# Patient Record
Sex: FEMALE | Race: BLACK OR AFRICAN AMERICAN | NOT HISPANIC OR LATINO | Employment: FULL TIME | URBAN - METROPOLITAN AREA
[De-identification: names, ages, dates, MRNs, and addresses within clinical notes are randomized per-mention and may not be internally consistent; named-entity substitution may affect disease eponyms.]

---

## 2017-02-06 ENCOUNTER — OFFICE VISIT (OUTPATIENT)
Dept: BARIATRICS | Facility: CLINIC | Age: 52
End: 2017-02-06
Payer: COMMERCIAL

## 2017-02-06 ENCOUNTER — LAB VISIT (OUTPATIENT)
Dept: LAB | Facility: HOSPITAL | Age: 52
End: 2017-02-06
Attending: SURGERY
Payer: COMMERCIAL

## 2017-02-06 VITALS
SYSTOLIC BLOOD PRESSURE: 170 MMHG | DIASTOLIC BLOOD PRESSURE: 106 MMHG | HEIGHT: 60 IN | BODY MASS INDEX: 39.73 KG/M2 | WEIGHT: 202.38 LBS | HEART RATE: 52 BPM

## 2017-02-06 DIAGNOSIS — I51.89 DIASTOLIC DYSFUNCTION: ICD-10-CM

## 2017-02-06 DIAGNOSIS — I10 ESSENTIAL HYPERTENSION: ICD-10-CM

## 2017-02-06 DIAGNOSIS — E66.9 OBESITY (BMI 30.0-34.9): ICD-10-CM

## 2017-02-06 DIAGNOSIS — Z98.84 S/P LAPAROSCOPIC SLEEVE GASTRECTOMY: ICD-10-CM

## 2017-02-06 DIAGNOSIS — G47.33 OBSTRUCTIVE SLEEP APNEA: ICD-10-CM

## 2017-02-06 LAB
25(OH)D3+25(OH)D2 SERPL-MCNC: 38 NG/ML
ALBUMIN SERPL BCP-MCNC: 3.9 G/DL
ALP SERPL-CCNC: 99 U/L
ALT SERPL W/O P-5'-P-CCNC: 25 U/L
ANION GAP SERPL CALC-SCNC: 7 MMOL/L
AST SERPL-CCNC: 20 U/L
BASOPHILS # BLD AUTO: 0.01 K/UL
BASOPHILS NFR BLD: 0.2 %
BILIRUB SERPL-MCNC: 0.5 MG/DL
BUN SERPL-MCNC: 21 MG/DL
CALCIUM SERPL-MCNC: 8.8 MG/DL
CHLORIDE SERPL-SCNC: 111 MMOL/L
CHOLEST/HDLC SERPL: 3.3 {RATIO}
CO2 SERPL-SCNC: 28 MMOL/L
CREAT SERPL-MCNC: 0.8 MG/DL
DIFFERENTIAL METHOD: ABNORMAL
EOSINOPHIL # BLD AUTO: 0.1 K/UL
EOSINOPHIL NFR BLD: 2.3 %
ERYTHROCYTE [DISTWIDTH] IN BLOOD BY AUTOMATED COUNT: 14 %
EST. GFR  (AFRICAN AMERICAN): >60 ML/MIN/1.73 M^2
EST. GFR  (NON AFRICAN AMERICAN): >60 ML/MIN/1.73 M^2
GLUCOSE SERPL-MCNC: 76 MG/DL
HCT VFR BLD AUTO: 41.9 %
HDL/CHOLESTEROL RATIO: 30.2 %
HDLC SERPL-MCNC: 222 MG/DL
HDLC SERPL-MCNC: 67 MG/DL
HGB BLD-MCNC: 13.2 G/DL
IRON SERPL-MCNC: 82 UG/DL
LDLC SERPL CALC-MCNC: 143.2 MG/DL
LYMPHOCYTES # BLD AUTO: 2.1 K/UL
LYMPHOCYTES NFR BLD: 47 %
MCH RBC QN AUTO: 28.5 PG
MCHC RBC AUTO-ENTMCNC: 31.5 %
MCV RBC AUTO: 91 FL
MONOCYTES # BLD AUTO: 0.2 K/UL
MONOCYTES NFR BLD: 5.3 %
NEUTROPHILS # BLD AUTO: 2 K/UL
NEUTROPHILS NFR BLD: 45 %
NONHDLC SERPL-MCNC: 155 MG/DL
PLATELET # BLD AUTO: 247 K/UL
PMV BLD AUTO: 10.5 FL
POTASSIUM SERPL-SCNC: 4 MMOL/L
PROT SERPL-MCNC: 7.3 G/DL
RBC # BLD AUTO: 4.63 M/UL
SATURATED IRON: 27 %
SODIUM SERPL-SCNC: 146 MMOL/L
TOTAL IRON BINDING CAPACITY: 303 UG/DL
TRANSFERRIN SERPL-MCNC: 205 MG/DL
TRIGL SERPL-MCNC: 59 MG/DL
VIT B12 SERPL-MCNC: 691 PG/ML
WBC # BLD AUTO: 4.38 K/UL

## 2017-02-06 PROCEDURE — 3077F SYST BP >= 140 MM HG: CPT | Mod: S$GLB,,, | Performed by: PHYSICIAN ASSISTANT

## 2017-02-06 PROCEDURE — 36415 COLL VENOUS BLD VENIPUNCTURE: CPT

## 2017-02-06 PROCEDURE — 82306 VITAMIN D 25 HYDROXY: CPT

## 2017-02-06 PROCEDURE — 85025 COMPLETE CBC W/AUTO DIFF WBC: CPT

## 2017-02-06 PROCEDURE — 80061 LIPID PANEL: CPT

## 2017-02-06 PROCEDURE — 80053 COMPREHEN METABOLIC PANEL: CPT

## 2017-02-06 PROCEDURE — 83540 ASSAY OF IRON: CPT

## 2017-02-06 PROCEDURE — 3080F DIAST BP >= 90 MM HG: CPT | Mod: S$GLB,,, | Performed by: PHYSICIAN ASSISTANT

## 2017-02-06 PROCEDURE — 99999 PR PBB SHADOW E&M-EST. PATIENT-LVL III: CPT | Mod: PBBFAC,,, | Performed by: PHYSICIAN ASSISTANT

## 2017-02-06 PROCEDURE — 82607 VITAMIN B-12: CPT

## 2017-02-06 PROCEDURE — 84466 ASSAY OF TRANSFERRIN: CPT

## 2017-02-06 PROCEDURE — 84425 ASSAY OF VITAMIN B-1: CPT

## 2017-02-06 PROCEDURE — 99214 OFFICE O/P EST MOD 30 MIN: CPT | Mod: S$GLB,,, | Performed by: PHYSICIAN ASSISTANT

## 2017-02-06 NOTE — PROGRESS NOTES
BARIATRIC FOLLOW UP:    HISTORY OF PRESENT ILLNESS: Lelia Mendoza is a 51 y.o. female, with a Body mass index is 39.53 kg/(m^2). presents for a 1 year follow up s/p Sleeve Gastrectomy with Dr. Leigh on 2/26/2015.  Her weight loss has always been slower since she is consistently low in daily protein.  She has done well overall and lost 54 lbs, approximately 42% of her excess weight. She has no other complaints.     Denies: nausea, vomiting, abdominal pain, changes in bowel movement pattern, fever, chills, dysphagia, chest pain, and shortness of breath.    Review of Systems   Constitutional: Negative for chills, fever and malaise/fatigue.   Eyes: Negative for blurred vision and double vision.   Respiratory: Negative for cough, hemoptysis and shortness of breath.    Cardiovascular: Negative for chest pain, palpitations and leg swelling.   Gastrointestinal: Negative for abdominal pain, blood in stool, constipation, diarrhea, heartburn, melena, nausea and vomiting.   Genitourinary: Negative for dysuria and hematuria.   Musculoskeletal: Negative.    Skin: Negative for rash.   Neurological: Negative for dizziness, tingling, weakness and headaches.   Endo/Heme/Allergies: Negative for environmental allergies. Does not bruise/bleed easily.   Psychiatric/Behavioral: Negative.        EXERCISE & VITAMINS:  See Bariatric Assessment    MEDICATIONS/ALLERGIES:  Have been reviewed.    DIET:  Regular Bariatric Diet.  Diet Recall.  Br:  Premier RTD (30 g), Nancy:  fish (15 g), Di:  Chicken wing (10 g), Sn: none (0 g), ~60 grams daily protein.    Vitals:    02/06/17 0857   BP: (!) 170/106   Pulse: (!) 52         Physical Exam   Constitutional: She is oriented to person, place, and time. She appears well-developed and well-nourished.   HENT:   Head: Normocephalic and atraumatic.   Cardiovascular: Normal rate and regular rhythm.    Pulmonary/Chest: Effort normal and breath sounds normal.   Abdominal: Soft. Bowel sounds are normal. She  exhibits no distension and no mass. There is no tenderness. There is no rebound and no guarding.   WHSS    Musculoskeletal: She exhibits no edema.   Neurological: She is alert and oriented to person, place, and time.   Skin: Skin is warm and dry. No rash noted. No erythema. No pallor.   Psychiatric: She has a normal mood and affect.   Nursing note and vitals reviewed.      ASSESSMENT:  - Obesity, Body mass index is 39.53 kg/(m^2). s/p sleeve gastrectomy on 2/26/2015.  - Co-morbidities: HTN (lower on medication), HLD (stable), GERD (stable)  - Good Weight loss, 54 lbs, 42% EWL  - No Exercise regimen  - Good Vitamin Regimen  - Fair Diet, low daily protein.    PLAN:  - Emphasized the importance of regular exercise and adherence to bariatric diet to achieve maximum weight loss.  She needs to get in more daily protein.  Gave her several tips on how to accomplish this.  Handouts given and all questions answered.  - No Bariatric Registered Dietician Available.  All Diet education and counseling done by PA.  - Encouraged patient to start regular exercise.  - Follow-up with dietician to reinforce diet.  - Continue daily vitamins and medications.  - RTC in 1 year or sooner if needed.  - Call the office for any issues.  - Check labs today and in 1 year.    25 minute visit, over 50% of time spent counseling patient face to face on diet, exercise, and weight loss.

## 2017-02-06 NOTE — MR AVS SNAPSHOT
[facility] - Bariatric Surgery  [address]  [facility] [address]  Phone: [phone]  Fax: [phone]                  Lelia Mendoza   2017 9:00 AM   Office Visit    Description:  Female : 1965   Provider:  Kristy Rachel PA-C   Department:  [facility] - Bariatric Surgery           Reason for Visit     Follow-up                To Do List           Goals (5 Years of Data)     None      [facility] On Call     [facility] On Call Nurse Care Line -  Assistance  Registered nurses in the [facility] On Call Center provide clinical advisement, health education, appointment booking, and other advisory services.  Call for this free service at [phone].             Medications           Message regarding Medications     Verify the changes and/or additions to your medication regime listed below are the same as discussed with your clinician today.  If any of these changes or additions are incorrect, please notify your healthcare provider.             Verify that the below list of medications is an accurate representation of the medications you are currently taking.  If none reported, the list may be blank. If incorrect, please contact your healthcare provider. Carry this list with you in case of emergency.           Current Medications     amlodipine (NORVASC) 10 MG tablet Take 1 tablet (10 mg total) by mouth once daily.    b complex vitamins tablet Take 1 tablet by mouth once daily.    CALCIUM CITRATE/VITAMIN D3 (CALCIUM CITRATE + ORAL) Take 30 mLs by mouth 2 (two) times daily.    cyanocobalamin, vitamin B-12, 5,000 mcg Subl Place 1 drop under the tongue as directed. 2x a week    multivitamin Liqd Take 30 mLs by mouth 2 (two) times daily.    sucralfate (CARAFATE) 1 gram tablet TAKE 1 TABLET (1 G TOTAL) BY MOUTH 4 (FOUR) TIMES DAILY.    gabapentin (NEURONTIN) 300 MG capsule Take 1 capsule (300 mg total) by mouth 3 (three) times daily.           Clinical Reference Information           Your  Vitals Were     BP Pulse Height Weight BMI    170/106 52 5' (1.524 m) 91.8 kg (202 lb 6.1 oz) 39.53 kg/m2      Blood Pressure          Most Recent Value    BP  (!)  170/106      Allergies as of 2/6/2017     Lisinopril      Immunizations Administered on Date of Encounter - 2/6/2017     None      Instructions    With meats take smaller bites and take longer to eat the meal.    - To lose weight you want to cut 100% starchy carbohydrates out of your diet (bread, rice, pasta, potatoes, granola, flour, corn, peas, oatmeal, grits, tortillas, crackers, chips) and get  grams of protein.  Aim for 100 grams of protein daily.    - Premier Protein (Chocolate, Bananas & Cream, Strawberries & Cream, Vanilla) Gisela or Costco    - Syntrax [place] from [employer], www.bariatricadVideoflowage.com, www.bariatricchoice.com. (LACTOSE FREE)    - Atkins Lift - Preventicet & Gisela (LACTOSE FREE)    - Veggetti Pro from [employer]ie[employer], Amazon, Bed Bath & Beyond    - www.Selleroutlet.com (cauliflower, cloud bread, quest bar cookies, eggplant, zucchini, zucchini noodles, crustless quiche, no carb meals, taco lettuce boats)    - http://theworldaccordingtoeggface.MedNews.ABOVE Solutions/    Cauliflower rice from Limos.com      Shantel's Bananas Foster Protein Shake    1 (11 oz.) Premier Protein Ready to Drink Bananas & Cream Shake  1/2 teaspoon Rum Extract  1 Tablespoon Sugar Free Butterscotch Pudding Mix, dry  dash of Cinnamon  a handful of Ice Cubes    Blend ingredients on high till well combined. I topped it (optional) with a squirt of Homemade, No Sugar Added Whipped Cream, a dash of Cinnamon, a Freeze-dried Banana Chip, and a swirl of Sugar Free Caramel Sauce.Yum! I don't advise lighting it on fire though.             Language Assistance Services     ATTENTION: Language assistance services are available, free of charge. Please call [phone].      ATENCIÓN: Si habla español, tiene a anderson disposición servicios gratuitos de asistencia lingüística. Llame al  [phone].     ALICIA Ý: N?u b?n nói Ti?ng Vi?t, có các d?ch v? h? tr? ngôn ng? mi?n phí dành cho b?n. G?i s? [phone].         [facility] complies with applicable Federal civil rights laws and does not discriminate on the basis of race, color, national origin, age, disability, or sex.

## 2017-02-06 NOTE — PATIENT INSTRUCTIONS
With meats take smaller bites and take longer to eat the meal.    - To lose weight you want to cut 100% starchy carbohydrates out of your diet (bread, rice, pasta, potatoes, granola, flour, corn, peas, oatmeal, grits, tortillas, crackers, chips) and get  grams of protein.  Aim for 100 grams of protein daily.    - Premier Protein (Chocolate, Bananas & Cream, Strawberries & Cream, Vanilla) Gisela or Costco    - Syntrax [place] from Vitamin Shoppe, www.bariatricadvantage.com, www.bariatricchoice.com. (LACTOSE FREE)    - Atkins Lift - [facility] & Gisela (LACTOSE FREE)    - Veggetti Pro from [employer]ation[employer], Amazon, Bed Bath & Beyond    - www.pinterest.com (cauliflower, cloud bread, quest bar cookies, eggplant, zucchini, zucchini noodles, crustless quiche, no carb meals, taco lettuce boats)    - http://thewmainaccordingtoeggface.BuzzStarter/    Cauliflower rice from Sprink[employer]rt      Shantel's Bananas Foster Protein Shake    1 (11 oz.) Premier Protein Ready to Drink Bananas & Cream Shake  1/2 teaspoon Rum Extract  1 Tablespoon Sugar Free Butterscotch Pudding Mix, dry  dash of Cinnamon  a handful of Ice Cubes    Blend ingredients on high till well combined. I topped it (optional) with a squirt of Homemade, No Sugar Added Whipped Cream, a dash of Cinnamon, a Freeze-dried Banana Chip, and a swirl of Sugar Free Caramel Sauce.Yum! I don't advise lighting it on fire though.

## 2017-02-07 LAB — VIT B1 SERPL-MCNC: 58 UG/L (ref 38–122)

## 2017-07-16 ENCOUNTER — HOSPITAL ENCOUNTER (EMERGENCY)
Facility: HOSPITAL | Age: 52
Discharge: HOME | End: 2017-07-16
Attending: EMERGENCY MEDICINE
Payer: COMMERCIAL

## 2017-07-16 VITALS
RESPIRATION RATE: 18 BRPM | HEIGHT: 61 IN | BODY MASS INDEX: 38.33 KG/M2 | WEIGHT: 203 LBS | TEMPERATURE: 99 F | OXYGEN SATURATION: 100 % | SYSTOLIC BLOOD PRESSURE: 140 MMHG | HEART RATE: 62 BPM | DIASTOLIC BLOOD PRESSURE: 82 MMHG

## 2017-07-16 DIAGNOSIS — S39.012A LOW BACK STRAIN, INITIAL ENCOUNTER: Primary | ICD-10-CM

## 2017-07-16 PROCEDURE — 96372 THER/PROPH/DIAG INJ SC/IM: CPT

## 2017-07-16 PROCEDURE — 99283 EMERGENCY DEPT VISIT LOW MDM: CPT | Mod: ,,, | Performed by: EMERGENCY MEDICINE

## 2017-07-16 PROCEDURE — 63600175 PHARM REV CODE 636 W HCPCS: Performed by: EMERGENCY MEDICINE

## 2017-07-16 PROCEDURE — 99283 EMERGENCY DEPT VISIT LOW MDM: CPT | Mod: 25

## 2017-07-16 RX ORDER — DEXAMETHASONE SODIUM PHOSPHATE 4 MG/ML
8 INJECTION, SOLUTION INTRA-ARTICULAR; INTRALESIONAL; INTRAMUSCULAR; INTRAVENOUS; SOFT TISSUE
Status: COMPLETED | OUTPATIENT
Start: 2017-07-16 | End: 2017-07-16

## 2017-07-16 RX ORDER — KETOROLAC TROMETHAMINE 30 MG/ML
10 INJECTION, SOLUTION INTRAMUSCULAR; INTRAVENOUS
Status: COMPLETED | OUTPATIENT
Start: 2017-07-16 | End: 2017-07-16

## 2017-07-16 RX ORDER — METHOCARBAMOL 750 MG/1
1500 TABLET, FILM COATED ORAL 3 TIMES DAILY PRN
Qty: 30 TABLET | Refills: 0 | Status: SHIPPED | OUTPATIENT
Start: 2017-07-16 | End: 2017-07-21

## 2017-07-16 RX ORDER — NAPROXEN 375 MG/1
375 TABLET ORAL 2 TIMES DAILY PRN
Qty: 30 TABLET | Refills: 0 | Status: SHIPPED | OUTPATIENT
Start: 2017-07-16 | End: 2017-08-08

## 2017-07-16 RX ADMIN — DEXAMETHASONE SODIUM PHOSPHATE 8 MG: 4 INJECTION, SOLUTION INTRAMUSCULAR; INTRAVENOUS at 04:07

## 2017-07-16 RX ADMIN — KETOROLAC TROMETHAMINE 10 MG: 30 INJECTION, SOLUTION INTRAMUSCULAR at 04:07

## 2017-07-16 NOTE — ED PROVIDER NOTES
Encounter Date: 2017    SCRIBE #1 NOTE: I, Katarzyna Martinez, am scribing for, and in the presence of,  Dr. Sethi. I have scribed the entire note.       History     Chief Complaint   Patient presents with    Flank Pain     Pt reports left sided flank pain that began around 2300. Denies painful urination or difficulty urinating.     Time seen by provider: 3:51 AM    This is a 52 y.o. female with PM Hx of HTN who presents with complaint of acute onset of left-sided flank pain that woke her up from sleep. No recent exertion or heavy lifting. Pt denies dysuria, abdominal pain, or any other symptoms at this time.       The history is provided by the patient and medical records.     Review of patient's allergies indicates:   Allergen Reactions    Lisinopril Swelling     Swelling of lips     Past Medical History:   Diagnosis Date    Hyperlipidemia LDL goal < 130     Hypertension     Migraine     over 10 years but not so bad lately    PHONG on CPAP     2015     Past Surgical History:   Procedure Laterality Date     SECTION      GASTRECTOMY      HYSTERECTOMY      with oopherectomy (1)     Family History   Problem Relation Age of Onset    Hypertension Mother     Arthritis Mother     Asthma Mother     Hyperlipidemia Mother     Heart disease Father     Stroke Brother     Heart disease Brother     Diabetes Maternal Grandmother     Diabetes Maternal Grandfather     Diabetes Paternal Grandmother     Diabetes Paternal Grandfather      Social History   Substance Use Topics    Smoking status: Never Smoker    Smokeless tobacco: Never Used    Alcohol use Yes      Comment: social     Review of Systems   Constitutional: Negative for fever.   Gastrointestinal: Negative for abdominal pain.   Genitourinary: Positive for flank pain (left). Negative for difficulty urinating and dysuria.       Physical Exam     Initial Vitals [17 0257]   BP Pulse Resp Temp SpO2   (!) 149/83 70 19 98.9 °F (37.2 °C) 100 %       MAP       105         Physical Exam    Nursing note and vitals reviewed.  Constitutional: She appears well-developed and well-nourished. She is not diaphoretic. No distress.   HENT:   Head: Normocephalic and atraumatic.   Mouth/Throat: Oropharynx is clear and moist.   Eyes: Conjunctivae are normal.   Neck: Normal range of motion. Neck supple. No JVD present.   Cardiovascular: Normal rate, regular rhythm and normal heart sounds.   No murmur heard.  Pulmonary/Chest: Breath sounds normal. No respiratory distress. She has no wheezes. She has no rhonchi. She has no rales.   Abdominal: Soft. Bowel sounds are normal. She exhibits no mass. There is no tenderness. There is no rebound and no guarding.   Musculoskeletal: Normal range of motion. She exhibits no edema.   Low back tenderness over the left erector spinae muscles. No bony tenderness. Movement of muscle worsens pain. Negative SLR bilaterally.    Neurological: She is alert and oriented to person, place, and time. She has normal strength. No cranial nerve deficit or sensory deficit.   Skin: Skin is warm and dry. No rash noted.   Psychiatric: She has a normal mood and affect.         ED Course   Procedures  Labs Reviewed - No data to display          Medical Decision Making:   History:   Old Medical Records: I decided to obtain old medical records.  Initial Assessment:   Pt presents with low back pain that woke her form sleep. Hurts with movement. Exam is confirmatory for musculoskeletal origin. I doubt renal colic, AAA, and infection. No urinary symptoms at this time. I don't think testing is indicated. Will treat with NSAID's and muscle relaxer.            Scribe Attestation:   Scribe #1: I performed the above scribed service and the documentation accurately describes the services I performed. I attest to the accuracy of the note.    Attending Attestation:           Physician Attestation for Scribe:  Physician Attestation Statement for Scribe #1: I, Dr. Sethi,  reviewed documentation, as scribed by Katarzyna Martinez in my presence, and it is both accurate and complete.                 ED Course     Clinical Impression:   The encounter diagnosis was Low back strain, initial encounter.    Disposition:   Disposition: Discharged  Condition: Stable                        Trey Sethi MD  07/18/17 9688

## 2017-07-16 NOTE — ED TRIAGE NOTES
Left flank pain radiates to left abdominal wall that started at 2300 last night. Denies pain similar in the past, n/v/d/cp/sob.

## 2017-08-08 ENCOUNTER — OFFICE VISIT (OUTPATIENT)
Dept: FAMILY MEDICINE | Facility: HOSPITAL | Age: 52
End: 2017-08-08
Attending: FAMILY MEDICINE
Payer: COMMERCIAL

## 2017-08-08 VITALS
DIASTOLIC BLOOD PRESSURE: 86 MMHG | HEART RATE: 59 BPM | SYSTOLIC BLOOD PRESSURE: 138 MMHG | BODY MASS INDEX: 39.41 KG/M2 | WEIGHT: 208.56 LBS

## 2017-08-08 DIAGNOSIS — M54.50 ACUTE LEFT-SIDED LOW BACK PAIN WITHOUT SCIATICA: Primary | ICD-10-CM

## 2017-08-08 PROCEDURE — 99213 OFFICE O/P EST LOW 20 MIN: CPT | Performed by: FAMILY MEDICINE

## 2017-08-08 RX ORDER — NAPROXEN 500 MG/1
500 TABLET ORAL 2 TIMES DAILY WITH MEALS
Qty: 28 TABLET | Refills: 0 | Status: SHIPPED | OUTPATIENT
Start: 2017-08-08 | End: 2017-08-22

## 2017-08-08 RX ORDER — HYDROGEN PEROXIDE 3 %
20 SOLUTION, NON-ORAL MISCELLANEOUS
Qty: 30 CAPSULE | Refills: 11 | Status: SHIPPED | OUTPATIENT
Start: 2017-08-08 | End: 2017-09-20

## 2017-08-08 NOTE — PROGRESS NOTES
I have reviewed the notes, assessments, and/or procedures performed by Dr. Ayers, I concur with her/his documentation of Lelia Mendoza.

## 2017-08-08 NOTE — PROGRESS NOTES
Subjective:       Patient ID: Lelia Mendoza is a 52 y.o. female.    Chief Complaint: Back Pain    Patient is a 51 yo F that presents to the clinic for complaints of left sided lower back pain that has been ongoing for 3 weeks. States that there is some radiation down left lower ext but no change in strength.        Review of Systems   Constitutional: Negative for chills and fever.   HENT: Negative for congestion and sore throat.    Eyes: Negative for visual disturbance.   Respiratory: Negative for cough, choking, chest tightness and shortness of breath.    Cardiovascular: Negative for chest pain.   Gastrointestinal: Negative for abdominal pain, constipation, diarrhea, nausea and vomiting.   Endocrine: Negative for polyuria.   Genitourinary: Negative for dysuria.   Musculoskeletal: Positive for back pain.   Neurological: Negative for dizziness, weakness and headaches.       Objective:      Vitals:    08/08/17 1602   BP: 138/86   Pulse: (!) 59     Physical Exam   Constitutional: She is oriented to person, place, and time. She appears well-developed and well-nourished.   HENT:   Head: Normocephalic and atraumatic.   Neck: Normal range of motion. Neck supple.   Cardiovascular: Normal rate, regular rhythm, normal heart sounds and intact distal pulses.  Exam reveals no gallop and no friction rub.    No murmur heard.  Pulmonary/Chest: Effort normal and breath sounds normal. No respiratory distress. She has no wheezes. She has no rales. She exhibits no tenderness.   Abdominal: Soft. Bowel sounds are normal. There is no tenderness.   Musculoskeletal: She exhibits tenderness. She exhibits no edema or deformity.        Right shoulder: She exhibits tenderness. She exhibits normal range of motion, no bony tenderness, no swelling, no effusion and no crepitus.   Neurological: She is alert and oriented to person, place, and time.   Skin: Skin is warm and dry.   Nursing note and vitals reviewed.      Assessment:       1. Acute  left-sided low back pain without sciatica        Plan:       Acute left-sided low back pain without sciatica  -     Ambulatory consult to Physical Therapy    Other orders  -     naproxen (NAPROSYN) 500 MG tablet; Take 1 tablet (500 mg total) by mouth 2 (two) times daily with meals.  Dispense: 28 tablet; Refill: 0  -     esomeprazole (NEXIUM) 20 MG capsule; Take 1 capsule (20 mg total) by mouth before breakfast.  Dispense: 30 capsule; Refill: 11      Dario Ayers MD  8/8/2017  [employer]  PGY-3

## 2017-08-09 ENCOUNTER — PATIENT MESSAGE (OUTPATIENT)
Dept: CARDIOLOGY | Facility: CLINIC | Age: 52
End: 2017-08-09

## 2017-08-09 ENCOUNTER — PATIENT MESSAGE (OUTPATIENT)
Dept: FAMILY MEDICINE | Facility: CLINIC | Age: 52
End: 2017-08-09

## 2017-08-09 NOTE — TELEPHONE ENCOUNTER
Good evening Ms. Mendoza, I apologize about your situation. Try Zantac over the counter and please let me know if it helps.   Dario Ayers MD  8/9/2017

## 2017-08-17 ENCOUNTER — CLINICAL SUPPORT (OUTPATIENT)
Dept: REHABILITATION | Facility: HOSPITAL | Age: 52
End: 2017-08-17
Attending: FAMILY MEDICINE
Payer: COMMERCIAL

## 2017-08-17 DIAGNOSIS — R26.9 GAIT ABNORMALITY: ICD-10-CM

## 2017-08-17 DIAGNOSIS — M53.86 DECREASED ROM OF LUMBAR SPINE: ICD-10-CM

## 2017-08-17 DIAGNOSIS — R20.0 NUMBNESS AND TINGLING OF LEFT LEG: ICD-10-CM

## 2017-08-17 DIAGNOSIS — M25.669 DECREASED ROM OF LOWER EXTREMITY: ICD-10-CM

## 2017-08-17 DIAGNOSIS — G89.29 CHRONIC LEFT-SIDED LOW BACK PAIN WITH LEFT-SIDED SCIATICA: ICD-10-CM

## 2017-08-17 DIAGNOSIS — R29.3 POOR POSTURE: ICD-10-CM

## 2017-08-17 DIAGNOSIS — M54.42 CHRONIC LEFT-SIDED LOW BACK PAIN WITH LEFT-SIDED SCIATICA: ICD-10-CM

## 2017-08-17 DIAGNOSIS — R20.2 NUMBNESS AND TINGLING OF LEFT LEG: ICD-10-CM

## 2017-08-17 DIAGNOSIS — Z74.09 IMPAIRED FUNCTIONAL MOBILITY AND ACTIVITY TOLERANCE: ICD-10-CM

## 2017-08-17 DIAGNOSIS — R53.1 DECREASED STRENGTH: ICD-10-CM

## 2017-08-17 PROCEDURE — 97162 PT EVAL MOD COMPLEX 30 MIN: CPT | Mod: PO

## 2017-08-17 NOTE — PROGRESS NOTES
Physical Therapy Evaluation    Name: Lelia Mendoza  Clinic Number: [id number]      Diagnosis:   Encounter Diagnoses   Name Primary?    Chronic left-sided low back pain with left-sided sciatica     Decreased strength     Poor posture     Numbness and tingling of left leg     Impaired functional mobility and activity tolerance     Gait abnormality     Decreased ROM of lower extremity     Decreased ROM of lumbar spine      Physician: Dario Ayers MD  Treatment Orders: PT Eval and Treat    Past Medical History:   Diagnosis Date    Hyperlipidemia LDL goal < 130     Hypertension     Migraine     over 10 years but not so bad lately    PHONG on CPAP     6/2015     Current Outpatient Prescriptions   Medication Sig    amlodipine (NORVASC) 10 MG tablet Take 1 tablet (10 mg total) by mouth once daily.    b complex vitamins tablet Take 1 tablet by mouth once daily.    CALCIUM CITRATE/VITAMIN D3 (CALCIUM CITRATE + ORAL) Take 30 mLs by mouth 2 (two) times daily.    cyanocobalamin, vitamin B-12, 5,000 mcg Subl Place 1 drop under the tongue as directed. 2x a week    esomeprazole (NEXIUM) 20 MG capsule Take 1 capsule (20 mg total) by mouth before breakfast.    multivitamin Liqd Take 30 mLs by mouth 2 (two) times daily.    naproxen (NAPROSYN) 500 MG tablet Take 1 tablet (500 mg total) by mouth 2 (two) times daily with meals.     No current facility-administered medications for this visit.      Review of patient's allergies indicates:   Allergen Reactions    Lisinopril Swelling     Swelling of lips         Evaluation Date: 8/17/17  Visit # authorized: 1  POC period: 8/17/17-10/12/17      Subjective/History   Onset/PATO: sudden- pt just woke up in the middle of the night with her symptoms    Precautions: standard  Medical Diagnosis: low back pain  PT Diagnosis: low back pain with scaitica, impaired gait, impaired posture, decreased strength, decreased ROM, impaired functional mobility  Chief complaint: Burning in L lower  back with intermittent numbness/tingling in lateral aspect of L thigh/LE  History of Present Illness: Lelia is a 52 y.o. female that presents to [facility] secondary to L sided back and LE pain. Symptoms started ~ 1 month ago. The pain woke her up in the middle of the night.  Pt had L flank burning and then numbness in L thigh. Pt went to the ER- got 2 shots (one was a steroid, pt unsure of the other). Pt also received pain medication. Pt states she had been able to use raise her LLE on medication, but not anymore. Now pt with patch of burning on L side of low back and intermittent numbness at L lateral thigh to knee    X-ray and MRI was taken and revealed: none  Prior Therapy: none  Nutrition:  Obese      Social History: Pt lives with her mom in a house  Place of Residence (Steps/Adaptations/Levels): 1 step to enter home, pt has to hold on. Home is one level inside. Has walk-in shower and a tub  Previous functional status includes: pt reports a history of LLE weakness and pain- pt states she's had to lift her LLE to get in and out of the car; uses a step stool to get into bed  Current functional status:  Pt has difficulty stooping down, difficulty going up and down steps- has to use ramp at the parking garage. Pt can't sit for too long- pt can sit for ~30min before pain limits her mobility. Pt can stand for ~ 1 hour before pain limits her.  Pt unable to sleep on her L side.  Pt has a decrease ability to perform ADLs such as L leg dressing, bathing, applying lotion (while seated- it's better in standing)  Exercise routine prior to onset : pt was trying to run, but she states it hurts. Pt has an elliptical at home, but she hasn't used it in a while  DME owned: grab bars in the bathroom (for pt's mom).  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 Pt works in [employer] at [employer].                        Job description includes:  Pt sits all day. Pt reports that since this started,  "she's been getting up and walking every 30min to manage symptoms    Pain: in L low back  Onset of pain /Mechanism of Onset:  Sudden- pt just woke up with the pain  Chief complaint:  Burning in back and weakness in LLE   Radicular symptoms:  Intermittent numbness at lateral aspect of thigh  Aggravating factors: sitting, standing, stooping, bending over in sitting, sleeping on pt's L side  Easing factors:  Pain medication, gabapentin, heating pad  Pain at current: 8/10  Pain at best: 2/10  Pain at worst:10       Patient Goals: Pt would like to decrease pain and increase function in order to return to normal daily activities and PLOF. "I want to be able to stoop down and get up with no problem."  No cultural, environmental, or spiritual barriers identified to treatment or learning.       Objective     Observation: pt received amb without a device    Posture:  Rounded shoulders, increased lumbar lordosis, B genu valgus L > R    Lumbar Range of Motion:    Degrees Pain   Flexion WFL   no        Extension 2/3 range   Yes- down L gluteal region        Left Side Bending 2/3 range Yes- down L gluteal region        Right Side Bending WFL no        Left rotation   WFL Yes- down L gluteal region        Right Rotation   WFL No             Lower Extremity Strength  Right LE  Left LE    Knee extension: 5/5 Knee extension: 4+/5   Knee flexion: 5/5 Knee flexion: 5/5   Hip flexion: 5/5 Hip flexion: 3-/5   Hip extension:  TBA Hip extension: TBA   Hip abduction: 5/5 Hip abduction: 5/5   Hip adduction: 5/5 Hip adduction 5/5   Ankle dorsiflexion: 5/5 Ankle dorsiflexion: 5/5   Ankle plantarflexion: 5/5 Ankle plantarflexion: 5/5       Special Tests:    -SLR Test: (+) on the L c/ Braggard's sign  -Ortiz's Test: (-) Bilaterally  - Slump test: (+) on the L    Palpation: no tenderness to palpation    Sensation: intact B and symmetrically      Functional Limitations Reports - G Codes  Category: Mobility   Tool: FOTO- lumbar  Score: 52% (48% " limitation)  Current: CK at least 40% < 60% impaired, limited or restricted  Goal: CJ at least 20% < 40% impaired, limited or restricted      PT Evaluation Completed? Yes  Discussed Plan of Care with patient: Yes    Pt received moist heat to L low back x 10 min at end of session. Pt reports some pain relief with intervention     Instructed pt. Regarding: Using pillow support between her knees when lying on her side for improved support and hip/spinal alignment. Pt provided with HEP consisting of standing HS stretch on step and seated sciatic nerve glides. Proper technique with all exercises. Pt was advised to perform these exercises free of pain, and to stop performing them if pain occurs. Pt demo good understanding of the education provided. Lelia demonstrated good return demonstration of activities.      Assessment     History  Co-morbidities and personal factors that may impact the plan of care Examination  Body Structures and Functions, activity limitations and participation restrictions that may impact the plan of care. Medical necessity is demonstrated by the following impairments. Clinical Presentation Decision Making/ Complexity Score   1. History of LLE weakness 1. Decreased strength  2. Pain  3. Impaired functional mobility  4. Decreased ROM  5. Impaired gait Evolving      moderate high moderate moderate           This is a 52 y.o. female referred to outpatient physical therapy and presents with a medical diagnosis of low back pain and demonstrates limitations as described in the problem list. At this time, pt appears to have sciatica either related to nerve root entrapment or disc herniation. Will continue to evaluate and assess. Pt will benefit from physcial therapy services in order to maximize pain free functional independence. The following goals were discussed with the patient and patient is in agreement with them as to be addressed in the treatment plan. Pt verbally understood the instructions as  they were given and demonstrated proper form and technique during therapy.     Medical necessity is demonstrated by the following IMPAIRMENTS/PROMBLEM LIST:    1)Increase in pain level limiting function   2)Core/back and LE weakness   3)Decreased spinal mobility   4)Decreased ability to participate in daily activities   5) Impaired muscle tone   6)Lack of HEP, requires skilled supervision to complete and progress HEP    7) Decreased community ambulation and stair negotiation   8) Decreased ability to participate in vocational pursuits    9)Poor posture   10) decreased activity tolerance/endurance      GOALS: Short Term Goals:  4 weeks  1.Report decreased back  pain  <   / =  5  /10  to increase tolerance for ADLs, sitting, walking, bending/stooping and other functional and work activities  2. Pt to increase back ROM to WFL t/o for improved functional mobility.  3. Increased MMT  By 1/2 grade  to increase tolerance for ADL, functional and work activities, as well as improved posture.  4. Pt to report ability to sit for 1 hour without back pain for improved ability to complete work tasks.  5. Pt to tolerate HEP to improve ROM and independence with ADL's  6. Pt will demo ability to comfortably reach her LE's for improved ability to perform lower body ADL's.  7. Pt will negotiate 1 flight of stairs with Mod I for improved community access.    Long Term Goals: 8 weeks  1.Report decreased    back  pain  <   / =  3  /10  to increase tolerance for  ADLs, sitting, walking, bending/stooping and other functional and work activities  2.Pt to demo ability to stoop/squat to  item off of the floor with proper body mechanics and no pain for improved functional mobility  3.Increased MMT by 1 grade  to increase tolerance for ADL and work activities,as well as for improved posture  4.Pt will report at CJ level (20-40% impaired) on FOTO to demonstrate decrease in disability and improvement in back pain.  5. Pt to be Independent  with HEP to improve ROM and independence with ADL's  6. Pt to report being able to stand/walk for >/=80min for improved functional mobility and community access      Plan     Pt will be treated by physical therapy 2 times a weekly for Pt Education, HEP, therapeutic exercises, neuromuscular re-education, therapeutic activities, manual therapy, joint mobilizations, and modalities PRN to achieve established goals. Lelia may at times be seen by a PTA as part of the Rehab Team.     Cont PT for 8 weeks.      Gloria Lynn, PT  08/18/2017      I certify the need for these services furnished under this plan of treatment and while under my care.______________________________ Physician/Referring Practitioner  Date of Signature

## 2017-08-18 NOTE — PLAN OF CARE
Physical Therapy Evaluation    Name: Lelia Mendoza  Clinic Number: [id number]      Diagnosis:   Encounter Diagnoses   Name Primary?    Chronic left-sided low back pain with left-sided sciatica     Decreased strength     Poor posture     Numbness and tingling of left leg     Impaired functional mobility and activity tolerance     Gait abnormality     Decreased ROM of lower extremity     Decreased ROM of lumbar spine      Physician: Dario Ayers MD  Treatment Orders: PT Eval and Treat    Past Medical History:   Diagnosis Date    Hyperlipidemia LDL goal < 130     Hypertension     Migraine     over 10 years but not so bad lately    PHONG on CPAP     6/2015     Current Outpatient Prescriptions   Medication Sig    amlodipine (NORVASC) 10 MG tablet Take 1 tablet (10 mg total) by mouth once daily.    b complex vitamins tablet Take 1 tablet by mouth once daily.    CALCIUM CITRATE/VITAMIN D3 (CALCIUM CITRATE + ORAL) Take 30 mLs by mouth 2 (two) times daily.    cyanocobalamin, vitamin B-12, 5,000 mcg Subl Place 1 drop under the tongue as directed. 2x a week    esomeprazole (NEXIUM) 20 MG capsule Take 1 capsule (20 mg total) by mouth before breakfast.    multivitamin Liqd Take 30 mLs by mouth 2 (two) times daily.    naproxen (NAPROSYN) 500 MG tablet Take 1 tablet (500 mg total) by mouth 2 (two) times daily with meals.     No current facility-administered medications for this visit.      Review of patient's allergies indicates:   Allergen Reactions    Lisinopril Swelling     Swelling of lips         Evaluation Date: 8/17/17  Visit # authorized: 1  POC period: 8/17/17-10/12/17      Subjective/History   Onset/PATO: sudden- pt just woke up in the middle of the night with her symptoms    Precautions: standard  Medical Diagnosis: low back pain  PT Diagnosis: low back pain with scaitica, impaired gait, impaired posture, decreased strength, decreased ROM, impaired functional mobility  Chief complaint: Burning in L lower  back with intermittent numbness/tingling in lateral aspect of L thigh/LE  History of Present Illness: Lelia is a 52 y.o. female that presents to [facility] secondary to L sided back and LE pain. Symptoms started ~ 1 month ago. The pain woke her up in the middle of the night.  Pt had L flank burning and then numbness in L thigh. Pt went to the ER- got 2 shots (one was a steroid, pt unsure of the other). Pt also received pain medication. Pt states she had been able to use raise her LLE on medication, but not anymore. Now pt with patch of burning on L side of low back and intermittent numbness at L lateral thigh to knee    X-ray and MRI was taken and revealed: none  Prior Therapy: none  Nutrition:  Obese      Social History: Pt lives with her mom in a house  Place of Residence (Steps/Adaptations/Levels): 1 step to enter home, pt has to hold on. Home is one level inside. Has walk-in shower and a tub  Previous functional status includes: pt reports a history of LLE weakness and pain- pt states she's had to lift her LLE to get in and out of the car; uses a step stool to get into bed  Current functional status:  Pt has difficulty stooping down, difficulty going up and down steps- has to use ramp at the parking garage. Pt can't sit for too long- pt can sit for ~30min before pain limits her mobility. Pt can stand for ~ 1 hour before pain limits her.  Pt unable to sleep on her L side.  Pt has a decrease ability to perform ADLs such as L leg dressing, bathing, applying lotion (while seated- it's better in standing)  Exercise routine prior to onset : pt was trying to run, but she states it hurts. Pt has an elliptical at home, but she hasn't used it in a while  DME owned: grab bars in the bathroom (for pt's mom).  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 Pt works in [employer] at [employer].                        Job description includes:  Pt sits all day. Pt reports that since this started,  "she's been getting up and walking every 30min to manage symptoms    Pain: in L low back  Onset of pain /Mechanism of Onset:  Sudden- pt just woke up with the pain  Chief complaint:  Burning in back and weakness in LLE   Radicular symptoms:  Intermittent numbness at lateral aspect of thigh  Aggravating factors: sitting, standing, stooping, bending over in sitting, sleeping on pt's L side  Easing factors:  Pain medication, gabapentin, heating pad  Pain at current: 8/10  Pain at best: 2/10  Pain at worst:10       Patient Goals: Pt would like to decrease pain and increase function in order to return to normal daily activities and PLOF. "I want to be able to stoop down and get up with no problem."  No cultural, environmental, or spiritual barriers identified to treatment or learning.       Objective     Observation: pt received amb without a device    Posture:  Rounded shoulders, increased lumbar lordosis, B genu valgus L > R    Lumbar Range of Motion:    Degrees Pain   Flexion WFL   no        Extension 2/3 range   Yes- down L gluteal region        Left Side Bending 2/3 range Yes- down L gluteal region        Right Side Bending WFL no        Left rotation   WFL Yes- down L gluteal region        Right Rotation   WFL No             Lower Extremity Strength  Right LE  Left LE    Knee extension: 5/5 Knee extension: 4+/5   Knee flexion: 5/5 Knee flexion: 5/5   Hip flexion: 5/5 Hip flexion: 3-/5   Hip extension:  TBA Hip extension: TBA   Hip abduction: 5/5 Hip abduction: 5/5   Hip adduction: 5/5 Hip adduction 5/5   Ankle dorsiflexion: 5/5 Ankle dorsiflexion: 5/5   Ankle plantarflexion: 5/5 Ankle plantarflexion: 5/5       Special Tests:    -SLR Test: (+) on the L c/ Braggard's sign  -Ortiz's Test: (-) Bilaterally  - Slump test: (+) on the L    Palpation: no tenderness to palpation    Sensation: intact B and symmetrically      Functional Limitations Reports - G Codes  Category: Mobility   Tool: FOTO- lumbar  Score: 52% (48% " limitation)  Current: CK at least 40% < 60% impaired, limited or restricted  Goal: CJ at least 20% < 40% impaired, limited or restricted      PT Evaluation Completed? Yes  Discussed Plan of Care with patient: Yes    Pt received moist heat to L low back x 10 min at end of session. Pt reports some pain relief with intervention     Instructed pt. Regarding: Using pillow support between her knees when lying on her side for improved support and hip/spinal alignment. Pt provided with HEP consisting of standing HS stretch on step and seated sciatic nerve glides. Proper technique with all exercises. Pt was advised to perform these exercises free of pain, and to stop performing them if pain occurs. Pt demo good understanding of the education provided. Lelia demonstrated good return demonstration of activities.      Assessment     History  Co-morbidities and personal factors that may impact the plan of care Examination  Body Structures and Functions, activity limitations and participation restrictions that may impact the plan of care. Medical necessity is demonstrated by the following impairments. Clinical Presentation Decision Making/ Complexity Score   1. History of LLE weakness 1. Decreased strength  2. Pain  3. Impaired functional mobility  4. Decreased ROM  5. Impaired gait Evolving      moderate high moderate moderate           This is a 52 y.o. female referred to outpatient physical therapy and presents with a medical diagnosis of low back pain and demonstrates limitations as described in the problem list. At this time, pt appears to have sciatica either related to nerve root entrapment or disc herniation. Will continue to evaluate and assess. Pt will benefit from physcial therapy services in order to maximize pain free functional independence. The following goals were discussed with the patient and patient is in agreement with them as to be addressed in the treatment plan. Pt verbally understood the instructions as  they were given and demonstrated proper form and technique during therapy.     Medical necessity is demonstrated by the following IMPAIRMENTS/PROMBLEM LIST:    1)Increase in pain level limiting function   2)Core/back and LE weakness   3)Decreased spinal mobility   4)Decreased ability to participate in daily activities   5) Impaired muscle tone   6)Lack of HEP, requires skilled supervision to complete and progress HEP    7) Decreased community ambulation and stair negotiation   8) Decreased ability to participate in vocational pursuits    9)Poor posture   10) decreased activity tolerance/endurance      GOALS: Short Term Goals:  4 weeks  1.Report decreased back  pain  <   / =  5  /10  to increase tolerance for ADLs, sitting, walking, bending/stooping and other functional and work activities  2. Pt to increase back ROM to WFL t/o for improved functional mobility.  3. Increased MMT  By 1/2 grade  to increase tolerance for ADL, functional and work activities, as well as improved posture.  4. Pt to report ability to sit for 1 hour without back pain for improved ability to complete work tasks.  5. Pt to tolerate HEP to improve ROM and independence with ADL's  6. Pt will demo ability to comfortably reach her LE's for improved ability to perform lower body ADL's.  7. Pt will negotiate 1 flight of stairs with Mod I for improved community access.    Long Term Goals: 8 weeks  1.Report decreased    back  pain  <   / =  3  /10  to increase tolerance for  ADLs, sitting, walking, bending/stooping and other functional and work activities  2.Pt to demo ability to stoop/squat to  item off of the floor with proper body mechanics and no pain for improved functional mobility  3.Increased MMT by 1 grade  to increase tolerance for ADL and work activities,as well as for improved posture  4.Pt will report at CJ level (20-40% impaired) on FOTO to demonstrate decrease in disability and improvement in back pain.  5. Pt to be Independent  with HEP to improve ROM and independence with ADL's  6. Pt to report being able to stand/walk for >/=80min for improved functional mobility and community access      Plan     Pt will be treated by physical therapy 2 times a weekly for Pt Education, HEP, therapeutic exercises, neuromuscular re-education, therapeutic activities, manual therapy, joint mobilizations, and modalities PRN to achieve established goals. Lelia may at times be seen by a PTA as part of the Rehab Team.     Cont PT for 8 weeks.      Gloria Lynn, PT  08/18/2017      I certify the need for these services furnished under this plan of treatment and while under my care.______________________________ Physician/Referring Practitioner  Date of Signature

## 2017-08-24 ENCOUNTER — CLINICAL SUPPORT (OUTPATIENT)
Dept: REHABILITATION | Facility: HOSPITAL | Age: 52
End: 2017-08-24
Attending: FAMILY MEDICINE
Payer: COMMERCIAL

## 2017-08-24 DIAGNOSIS — M54.42 CHRONIC LEFT-SIDED LOW BACK PAIN WITH LEFT-SIDED SCIATICA: ICD-10-CM

## 2017-08-24 DIAGNOSIS — Z74.09 IMPAIRED FUNCTIONAL MOBILITY AND ACTIVITY TOLERANCE: ICD-10-CM

## 2017-08-24 DIAGNOSIS — M53.86 DECREASED ROM OF LUMBAR SPINE: ICD-10-CM

## 2017-08-24 DIAGNOSIS — R53.1 DECREASED STRENGTH: ICD-10-CM

## 2017-08-24 DIAGNOSIS — R20.0 NUMBNESS AND TINGLING OF LEFT LEG: ICD-10-CM

## 2017-08-24 DIAGNOSIS — M25.669 DECREASED ROM OF LOWER EXTREMITY: ICD-10-CM

## 2017-08-24 DIAGNOSIS — R29.3 POOR POSTURE: ICD-10-CM

## 2017-08-24 DIAGNOSIS — R26.9 GAIT ABNORMALITY: ICD-10-CM

## 2017-08-24 DIAGNOSIS — G89.29 CHRONIC LEFT-SIDED LOW BACK PAIN WITH LEFT-SIDED SCIATICA: ICD-10-CM

## 2017-08-24 DIAGNOSIS — R20.2 NUMBNESS AND TINGLING OF LEFT LEG: ICD-10-CM

## 2017-08-24 PROCEDURE — 97110 THERAPEUTIC EXERCISES: CPT | Mod: PO

## 2017-08-24 NOTE — PROGRESS NOTES
Name: Lelia Mendoza  Clinic Number: [id number]  Diagnosis:   No diagnosis found.  Physician: Dario Ayers MD  Treatment Orders: PT Eval and Treat    Past Medical History:   Diagnosis Date    Hyperlipidemia LDL goal < 130     Hypertension     Migraine     over 10 years but not so bad lately    PHONG on CPAP     6/2015         Evaluation Date: 8/17/17  Visit #2 authorized: 25  POC period: 8/17/17-10/12/17      Subjective/History     Patient reports burning in the L back and sharp pain in the front of the thigh. Patient reports she had to use the ramp at work instead of the stairs and that was painful.  Patient reports some days the pain travels down the leg but today it is staying in the top of the thigh.  Patient reports she had a bad night last night and the back was burning all night.  Patient reports she kept waking up because of the pain. Patient reports she took gabapentin in the morning and that helped ease the burning a little.  Patient reports she was able to do her HEP with no pain while doing them, but pain afterwards.  Patient reports the pain may not have been from the exercises.    Pain: 7/10 in Low back and L hip flexor area    Objective     Observation: pt received amb without a device    Pt received moist heat to L low back with wedge under knees x 10 min at the beginning of the session. Pt reports some pain relief with intervention      Lelia received therapeutic exercises to develop strength, ROM, flexibility and core stabilization for 50 minutes including:     Glut sets in supine with wedge under knees x 20  Ankle dorsiflexion in supine with wedge under knees x 20  Posterior pelvic tilts in supine with wedge under knees x 20  Hip adduction with ball in supine x 20  Hip abduction with green theraband in supine with pelvic tilt x 20  Seated sciatic nerve glides x 15  Massage stick to L piriformis x 5 minutes  Standing HS stretch on step 3 x 30 sec    Patient received 1 on 1 therapeutic exercise for  40 minutes         Instructed pt. Regarding all exercises. Pt provided with HEP consisting of glut sets, supine hip abduction, supine hip adduction, pelvic tilts. Proper technique with all exercises. Pt was advised to perform these exercises free of pain, and to stop performing them if pain occurs. Pt demo good understanding of the education provided. Lelia demonstrated good return demonstration of activities.        Functional Limitations Reports - G Codes  Category: Mobility   Tool: FOTO- lumbar  Score: 52% (48% limitation)  Current: CK at least 40% < 60% impaired, limited or restricted  Goal: CJ at least 20% < 40% impaired, limited or restricted        Assessment     Patient tolerated treatment well with reports of decreased pain, 5/10, following treatment.  Patient reported increased pain when attempting SKTC stretch but otherwise, had no pain with exercises.  Will progress as tolerated. Pt will benefit from physcial therapy services in order to maximize pain free functional independence.           History  Co-morbidities and personal factors that may impact the plan of care Examination  Body Structures and Functions, activity limitations and participation restrictions that may impact the plan of care. Medical necessity is demonstrated by the following impairments. Clinical Presentation Decision Making/ Complexity Score   1. History of LLE weakness 1. Decreased strength  2. Pain  3. Impaired functional mobility  4. Decreased ROM  5. Impaired gait Evolving      moderate high moderate moderate         Medical necessity is demonstrated by the following IMPAIRMENTS/PROMBLEM LIST:    1)Increase in pain level limiting function   2)Core/back and LE weakness   3)Decreased spinal mobility   4)Decreased ability to participate in daily activities   5) Impaired muscle tone   6)Lack of HEP, requires skilled supervision to complete and progress HEP    7) Decreased community ambulation and stair negotiation   8) Decreased  ability to participate in vocational pursuits    9)Poor posture   10) decreased activity tolerance/endurance      GOALS: Short Term Goals:  4 weeks  1.Report decreased back  pain  <   / =  5  /10  to increase tolerance for ADLs, sitting, walking, bending/stooping and other functional and work activities  2. Pt to increase back ROM to WFL t/o for improved functional mobility.  3. Increased MMT  By 1/2 grade  to increase tolerance for ADL, functional and work activities, as well as improved posture.  4. Pt to report ability to sit for 1 hour without back pain for improved ability to complete work tasks.  5. Pt to tolerate HEP to improve ROM and independence with ADL's  6. Pt will demo ability to comfortably reach her LE's for improved ability to perform lower body ADL's.  7. Pt will negotiate 1 flight of stairs with Mod I for improved community access.    Long Term Goals: 8 weeks  1.Report decreased    back  pain  <   / =  3  /10  to increase tolerance for  ADLs, sitting, walking, bending/stooping and other functional and work activities  2.Pt to demo ability to stoop/squat to  item off of the floor with proper body mechanics and no pain for improved functional mobility  3.Increased MMT by 1 grade  to increase tolerance for ADL and work activities,as well as for improved posture  4.Pt will report at CJ level (20-40% impaired) on FOTO to demonstrate decrease in disability and improvement in back pain.  5. Pt to be Independent with HEP to improve ROM and independence with ADL's  6. Pt to report being able to stand/walk for >/=80min for improved functional mobility and community access      Plan     Pt will be treated by physical therapy 2 times a weekly for Pt Education, HEP, therapeutic exercises, neuromuscular re-education, therapeutic activities, manual therapy, joint mobilizations, and modalities PRN to achieve established goals. Lelia may at times be seen by a PTA as part of the Rehab Team.

## 2017-09-07 ENCOUNTER — CLINICAL SUPPORT (OUTPATIENT)
Dept: REHABILITATION | Facility: HOSPITAL | Age: 52
End: 2017-09-07
Attending: FAMILY MEDICINE
Payer: COMMERCIAL

## 2017-09-07 DIAGNOSIS — R20.2 NUMBNESS AND TINGLING OF LEFT LEG: ICD-10-CM

## 2017-09-07 DIAGNOSIS — R53.1 DECREASED STRENGTH: ICD-10-CM

## 2017-09-07 DIAGNOSIS — M53.86 DECREASED ROM OF LUMBAR SPINE: ICD-10-CM

## 2017-09-07 DIAGNOSIS — M54.42 CHRONIC LEFT-SIDED LOW BACK PAIN WITH LEFT-SIDED SCIATICA: ICD-10-CM

## 2017-09-07 DIAGNOSIS — G89.29 CHRONIC LEFT-SIDED LOW BACK PAIN WITH LEFT-SIDED SCIATICA: ICD-10-CM

## 2017-09-07 DIAGNOSIS — R26.9 GAIT ABNORMALITY: ICD-10-CM

## 2017-09-07 DIAGNOSIS — R29.3 POOR POSTURE: ICD-10-CM

## 2017-09-07 DIAGNOSIS — Z74.09 IMPAIRED FUNCTIONAL MOBILITY AND ACTIVITY TOLERANCE: ICD-10-CM

## 2017-09-07 DIAGNOSIS — R20.0 NUMBNESS AND TINGLING OF LEFT LEG: ICD-10-CM

## 2017-09-07 DIAGNOSIS — M25.669 DECREASED ROM OF LOWER EXTREMITY: ICD-10-CM

## 2017-09-07 PROCEDURE — 97110 THERAPEUTIC EXERCISES: CPT | Mod: PO

## 2017-09-07 NOTE — PROGRESS NOTES
Name: Lelia Mendoza  Clinic Number: [id number]  Diagnosis:   Encounter Diagnoses   Name Primary?    Chronic left-sided low back pain with left-sided sciatica     Decreased strength     Poor posture     Numbness and tingling of left leg     Impaired functional mobility and activity tolerance     Gait abnormality     Decreased ROM of lower extremity     Decreased ROM of lumbar spine      Physician: Dario Ayers MD  Treatment Orders: PT Eval and Treat    Past Medical History:   Diagnosis Date    Hyperlipidemia LDL goal < 130     Hypertension     Migraine     over 10 years but not so bad lately    PHONG on CPAP     6/2015         Evaluation Date: 8/17/17  Visit #3 authorized: 25  POC period: 8/17/17-10/12/17      Subjective/History     Patient reports there was a fire drill at her work and she had to go down 49 flights of stairs today and yesterday.  Patient reports her pain was worse yesterday than it is today, but today's pain is still bad. Patient denies pain in the back today.  Patient reports she felt good after last treatment.  Patient reports she wasn't able to do the exercises over the weekend but she was able to do them Thursday and then starting back Monday. Patient reports she does them in the mornings.  Patient reports she brings the theraband to work so she can do that exercise.  Patient reports she had an episode of burning pain for about 2 hours on Sunday.  Patient reports the only thing that helped was taking the gabapentin.  Patient reports she doesn't want to rely on the gabapentin, but that is the only thing that is helping.  Patient reports she takes it mostly just at night because it makes her drowsy.    Pain is when she has to move her leg up or moving it down from a raised position.  Patient reports once the leg is in the position, it is okay.  Patient reports going up the stairs, she has to  the left leg using her UE.  Patient reports going downstairs is still painful, but she can  tolerate it.    Pain: 5/10 in  L hip flexor area    Objective     Observation: pt received amb without a device    Lelia received therapeutic exercises to develop strength, ROM, flexibility and core stabilization for 56 minutes including:     Glut sets in supine with wedge under knees x 20  Ankle dorsiflexion in supine with wedge under knees x 30  Posterior pelvic tilts in supine with wedge under knees x 30  Hip adduction with ball in supine x 30  Hip abduction with green theraband in supine with pelvic tilt x 30  Seated sciatic nerve glides x 20  Massage stick to L piriformis x 5 minutes  Standing HS stretch on step 3 x 30 sec  Gastroc stretch on incline 3 x 30 sec  Supine hip flexor off the side of the mat 3 x 1 minute  STM to L hip flexor/quad x 5 minutes    Pt received moist heat to L low back with wedge under knees x 10 min at the end of the session. Pt reports some pain relief with intervention       Instructed pt. Regarding all exercises. Pt provided with HEP consisting of supine hip flexor stretch. Proper technique with all exercises. Pt was advised to perform these exercises free of pain, and to stop performing them if pain occurs. Pt demo good understanding of the education provided. Lelia demonstrated good return demonstration of activities.        Functional Limitations Reports - G Codes  Category: Mobility   Tool: FOTO- lumbar  Score: 52% (48% limitation)  Current: CK at least 40% < 60% impaired, limited or restricted  Goal: CJ at least 20% < 40% impaired, limited or restricted        Assessment     Patient tolerated treatment well with reports of decreased pain, 5/10, following treatment.  Patient attempted sitting and supine piriformis stretch but was unable to secondary to pain with hip flexion.  Patient demonstrated visible pain when attempted crossed leg position bringing the leg up, and putting it down.  Patient was TTP in quad and hip flexor area with STM.  Patient was limited in strengthening  secondary to increased pain.  Patient is only able to perform nerve stretching in the seated position.  Patient may be limited in therapy due to be unable to tolerate hip flexion with exercises.  Will progress as tolerated. Pt will benefit from physcial therapy services in order to maximize pain free functional independence.           History  Co-morbidities and personal factors that may impact the plan of care Examination  Body Structures and Functions, activity limitations and participation restrictions that may impact the plan of care. Medical necessity is demonstrated by the following impairments. Clinical Presentation Decision Making/ Complexity Score   1. History of LLE weakness 1. Decreased strength  2. Pain  3. Impaired functional mobility  4. Decreased ROM  5. Impaired gait Evolving      moderate high moderate moderate         Medical necessity is demonstrated by the following IMPAIRMENTS/PROMBLEM LIST:    1)Increase in pain level limiting function   2)Core/back and LE weakness   3)Decreased spinal mobility   4)Decreased ability to participate in daily activities   5) Impaired muscle tone   6)Lack of HEP, requires skilled supervision to complete and progress HEP    7) Decreased community ambulation and stair negotiation   8) Decreased ability to participate in vocational pursuits    9)Poor posture   10) decreased activity tolerance/endurance      GOALS: Short Term Goals:  4 weeks  1.Report decreased back  pain  <   / =  5  /10  to increase tolerance for ADLs, sitting, walking, bending/stooping and other functional and work activities  2. Pt to increase back ROM to WFL t/o for improved functional mobility.  3. Increased MMT  By 1/2 grade  to increase tolerance for ADL, functional and work activities, as well as improved posture.  4. Pt to report ability to sit for 1 hour without back pain for improved ability to complete work tasks.  5. Pt to tolerate HEP to improve ROM and independence with ADL's  6. Pt  will demo ability to comfortably reach her LE's for improved ability to perform lower body ADL's.  7. Pt will negotiate 1 flight of stairs with Mod I for improved community access.    Long Term Goals: 8 weeks  1.Report decreased    back  pain  <   / =  3  /10  to increase tolerance for  ADLs, sitting, walking, bending/stooping and other functional and work activities  2.Pt to demo ability to stoop/squat to  item off of the floor with proper body mechanics and no pain for improved functional mobility  3.Increased MMT by 1 grade  to increase tolerance for ADL and work activities,as well as for improved posture  4.Pt will report at CJ level (20-40% impaired) on FOTO to demonstrate decrease in disability and improvement in back pain.  5. Pt to be Independent with HEP to improve ROM and independence with ADL's  6. Pt to report being able to stand/walk for >/=80min for improved functional mobility and community access      Plan     Pt will be treated by physical therapy 2 times a weekly for Pt Education, HEP, therapeutic exercises, neuromuscular re-education, therapeutic activities, manual therapy, joint mobilizations, and modalities PRN to achieve established goals. Lelia may at times be seen by a PTA as part of the Rehab Team.

## 2017-09-11 ENCOUNTER — CLINICAL SUPPORT (OUTPATIENT)
Dept: REHABILITATION | Facility: HOSPITAL | Age: 52
End: 2017-09-11
Attending: FAMILY MEDICINE
Payer: COMMERCIAL

## 2017-09-11 DIAGNOSIS — R53.1 DECREASED STRENGTH: ICD-10-CM

## 2017-09-11 DIAGNOSIS — M54.42 CHRONIC LEFT-SIDED LOW BACK PAIN WITH LEFT-SIDED SCIATICA: ICD-10-CM

## 2017-09-11 DIAGNOSIS — R26.9 GAIT ABNORMALITY: ICD-10-CM

## 2017-09-11 DIAGNOSIS — M53.86 DECREASED ROM OF LUMBAR SPINE: ICD-10-CM

## 2017-09-11 DIAGNOSIS — Z74.09 IMPAIRED FUNCTIONAL MOBILITY AND ACTIVITY TOLERANCE: ICD-10-CM

## 2017-09-11 DIAGNOSIS — M25.669 DECREASED ROM OF LOWER EXTREMITY: ICD-10-CM

## 2017-09-11 DIAGNOSIS — G89.29 CHRONIC LEFT-SIDED LOW BACK PAIN WITH LEFT-SIDED SCIATICA: ICD-10-CM

## 2017-09-11 DIAGNOSIS — R20.0 NUMBNESS AND TINGLING OF LEFT LEG: ICD-10-CM

## 2017-09-11 DIAGNOSIS — R20.2 NUMBNESS AND TINGLING OF LEFT LEG: ICD-10-CM

## 2017-09-11 DIAGNOSIS — R29.3 POOR POSTURE: ICD-10-CM

## 2017-09-11 PROCEDURE — 97110 THERAPEUTIC EXERCISES: CPT | Mod: PO

## 2017-09-11 NOTE — PROGRESS NOTES
Name: Lelia Mendoza  Clinic Number: [id number]  Diagnosis:   Encounter Diagnoses   Name Primary?    Chronic left-sided low back pain with left-sided sciatica     Decreased strength     Poor posture     Numbness and tingling of left leg     Impaired functional mobility and activity tolerance     Gait abnormality     Decreased ROM of lower extremity     Decreased ROM of lumbar spine      Physician: Dario Ayers MD  Treatment Orders: PT Eval and Treat    Past Medical History:   Diagnosis Date    Hyperlipidemia LDL goal < 130     Hypertension     Migraine     over 10 years but not so bad lately    PHONG on CPAP     6/2015         Evaluation Date: 8/17/17  Visit # 4 authorized: 25  POC period: 8/17/17-10/12/17      Subjective/History     Patient reports no burning in the back.  Patient reports her L anterior thigh pain was worse this morning but her pain tends to be more in the mornings.  Patient reports no increase in pain following last treatment.    Pain: 5/10 in  L hip flexor area    Objective     Observation: pt received amb without a device    Pt received moist heat to L low back with wedge under knees x 8 min at the beginning of the session. Pt reports some pain relief with intervention    Lelia received therapeutic exercises to develop strength, ROM, flexibility and core stabilization for 56 minutes including:     Glut sets in supine with wedge under knees x 20  Ankle dorsiflexion in supine with wedge under knees x 30  Posterior pelvic tilts in supine with wedge under knees x 30  Hip adduction with ball in supine x 30  Hip abduction with green theraband in supine with pelvic tilt x 30  Seated sciatic nerve glides x 20  Lateral hip glides x 4 minutes         Instructed pt. Regarding all exercises. Pt provided none added. Proper technique with all exercises. Pt was advised to perform these exercises free of pain, and to stop performing them if pain occurs. Pt demo good understanding of the education  swathi Lelia demonstrated good return demonstration of activities.        Functional Limitations Reports - G Codes  Category: Mobility   Tool: FOTO- lumbar  Score: 52% (48% limitation)  Current: CK at least 40% < 60% impaired, limited or restricted  Goal: CJ at least 20% < 40% impaired, limited or restricted        Assessment     Patient tolerated treatment well with reports of decreased pain in hip following treatment.  Patient with continued pain with hip flexion.  Lateral distraction was performed and patient reported some relief. Will discuss with PT for further evaluation of possible hip involvement.  Will progress as tolerated. Pt will benefit from physical therapy services in order to maximize pain free functional independence.           History  Co-morbidities and personal factors that may impact the plan of care Examination  Body Structures and Functions, activity limitations and participation restrictions that may impact the plan of care. Medical necessity is demonstrated by the following impairments. Clinical Presentation Decision Making/ Complexity Score   1. History of LLE weakness 1. Decreased strength  2. Pain  3. Impaired functional mobility  4. Decreased ROM  5. Impaired gait Evolving      moderate high moderate moderate         Medical necessity is demonstrated by the following IMPAIRMENTS/PROMBLEM LIST:    1)Increase in pain level limiting function   2)Core/back and LE weakness   3)Decreased spinal mobility   4)Decreased ability to participate in daily activities   5) Impaired muscle tone   6)Lack of HEP, requires skilled supervision to complete and progress HEP    7) Decreased community ambulation and stair negotiation   8) Decreased ability to participate in vocational pursuits    9)Poor posture   10) decreased activity tolerance/endurance      GOALS: Short Term Goals:  4 weeks  1.Report decreased back  pain  <   / =  5  /10  to increase tolerance for ADLs, sitting, walking, bending/stooping  and other functional and work activities  2. Pt to increase back ROM to WFL t/o for improved functional mobility.  3. Increased MMT  By 1/2 grade  to increase tolerance for ADL, functional and work activities, as well as improved posture.  4. Pt to report ability to sit for 1 hour without back pain for improved ability to complete work tasks.  5. Pt to tolerate HEP to improve ROM and independence with ADL's  6. Pt will demo ability to comfortably reach her LE's for improved ability to perform lower body ADL's.  7. Pt will negotiate 1 flight of stairs with Mod I for improved community access.    Long Term Goals: 8 weeks  1.Report decreased    back  pain  <   / =  3  /10  to increase tolerance for  ADLs, sitting, walking, bending/stooping and other functional and work activities  2.Pt to demo ability to stoop/squat to  item off of the floor with proper body mechanics and no pain for improved functional mobility  3.Increased MMT by 1 grade  to increase tolerance for ADL and work activities,as well as for improved posture  4.Pt will report at CJ level (20-40% impaired) on FOTO to demonstrate decrease in disability and improvement in back pain.  5. Pt to be Independent with HEP to improve ROM and independence with ADL's  6. Pt to report being able to stand/walk for >/=80min for improved functional mobility and community access      Plan     Pt will be treated by physical therapy 2 times a weekly for Pt Education, HEP, therapeutic exercises, neuromuscular re-education, therapeutic activities, manual therapy, joint mobilizations, and modalities PRN to achieve established goals. Lelia may at times be seen by a PTA as part of the Rehab Team.

## 2017-09-14 ENCOUNTER — CLINICAL SUPPORT (OUTPATIENT)
Dept: REHABILITATION | Facility: HOSPITAL | Age: 52
End: 2017-09-14
Attending: FAMILY MEDICINE
Payer: COMMERCIAL

## 2017-09-14 DIAGNOSIS — R53.1 DECREASED STRENGTH: ICD-10-CM

## 2017-09-14 DIAGNOSIS — G89.29 CHRONIC LEFT-SIDED LOW BACK PAIN WITH LEFT-SIDED SCIATICA: ICD-10-CM

## 2017-09-14 DIAGNOSIS — R29.3 POOR POSTURE: ICD-10-CM

## 2017-09-14 DIAGNOSIS — R26.9 GAIT ABNORMALITY: ICD-10-CM

## 2017-09-14 DIAGNOSIS — R20.2 NUMBNESS AND TINGLING OF LEFT LEG: ICD-10-CM

## 2017-09-14 DIAGNOSIS — M54.42 CHRONIC LEFT-SIDED LOW BACK PAIN WITH LEFT-SIDED SCIATICA: ICD-10-CM

## 2017-09-14 DIAGNOSIS — R20.0 NUMBNESS AND TINGLING OF LEFT LEG: ICD-10-CM

## 2017-09-14 DIAGNOSIS — M25.669 DECREASED ROM OF LOWER EXTREMITY: ICD-10-CM

## 2017-09-14 DIAGNOSIS — Z74.09 IMPAIRED FUNCTIONAL MOBILITY AND ACTIVITY TOLERANCE: ICD-10-CM

## 2017-09-14 DIAGNOSIS — M53.86 DECREASED ROM OF LUMBAR SPINE: ICD-10-CM

## 2017-09-14 PROCEDURE — 97110 THERAPEUTIC EXERCISES: CPT | Mod: PO

## 2017-09-14 NOTE — PROGRESS NOTES
Name: Lelia Mendoza  Clinic Number: [id number]  Diagnosis:   Encounter Diagnoses   Name Primary?    Chronic left-sided low back pain with left-sided sciatica     Decreased strength     Poor posture     Numbness and tingling of left leg     Impaired functional mobility and activity tolerance     Gait abnormality     Decreased ROM of lower extremity     Decreased ROM of lumbar spine      Physician: Dario Ayers MD  Treatment Orders: PT Eval and Treat    Past Medical History:   Diagnosis Date    Hyperlipidemia LDL goal < 130     Hypertension     Migraine     over 10 years but not so bad lately    PHONG on CPAP     2015         Evaluation Date: 17  Visit # 5 authorized: 25  POC period: 17-10/12/17  Time In: 17:15 (pt with late arrival)  Time Out: 18:10      Subjective/History     Patient reports no burning in the back.  Patient reports her L anterior thigh pain was worse this morning but her pain tends to be more in the mornings.  Overall, pt states she feels like symptoms haven't changed since starting therapy. Pt continues to report difficulty with lower body dressing, difficulty getting LLE in and out of the car, difficulty getting in and out of bathtub, etc    Pain: 3-5/10 in L hip thigh and hip (mostly at front and lateral aspect of her hip), pain travels into groin at times    Objective     Observation: pt received amb without a device    Pt received moist heat to L low back with wedge under knees x 10 min at the beginning of the session. Pt reports some pain relief with intervention    Lelia received therapeutic exercises to develop strength, ROM, flexibility and core stabilization for 35 minutes includin:1 therex x 18 min    Lumbar Range of Motion:     Degrees Pain   Flexion WFL no       Extension 2/3 range Yes- into L hip       Left Side Bending WFL Yes- down L gluteal region       Right Side Bending WFL no       Left rotation WFL No        Right Rotation WFL No             Lower  "Extremity Strength  Right LE   Left LE     Knee extension: 5/5 Knee extension: 5/5   Knee flexion: 5/5 Knee flexion: 5/5   Hip flexion: 5/5 Hip flexion: 3-/5   Hip extension:  >/=3/5 Hip extension: >/=3/5   Hip abduction: 5/5 Hip abduction: 5/5   Hip adduction: 5/5 Hip adduction 5/5   Ankle dorsiflexion: 5/5 Ankle dorsiflexion: 5/5   Ankle plantarflexion: 5/5 Ankle plantarflexion: 5/5         Seated B hamstring stretches, 2 x 30"/side  Seated L LAQs, 2 x 10 reps  Seated sciatic nerve glides x 20  Glut sets in supine with wedge under knees x 20  Hip adduction with ball in supine x 20, 3" holds  Gentle L hip distraction in supine for improved hip mechanics*  Gentle B hip distraction in supine*   *pt reports pain relief with both exercises    Not performed today:  Hip abduction with green theraband in supine with pelvic tilt x 30  Lateral hip glides x 4 minutes  Ankle dorsiflexion in supine with wedge under knees x 30  Posterior pelvic tilts in supine with wedge under knees x 30    Pt participated in therapeutic activity for improved functional mobility x 5 min  Pt performs 5 reps of sit <> stand c/ Mod I.  Pt performs 3 reps of supine <> sit c/ S- CGA (intermittent support at LLE for pain relief).     Instructed pt. Regarding all exercises. Pt provided none added. Proper technique with all exercises. Pt was advised to perform these exercises free of pain, and to stop performing them if pain occurs. Pt demo good understanding of the education provided. Lelia demonstrated good return demonstration of activities.          Assessment     Patient tolerated treatment fair with reports of decreased hip pain following distraction. Pt with slightly improved lumbar ROM and has some reduced back pain with ROM. Pain with some reduced pain overall. However, pt has difficulty tolerating most exercises/activities as well as positions for special tests to further assess hip. Given some of pt's symptoms and pain presentation, it " appears that symptoms and impaired mobility are likely due to L hip pathology. Pt's functional limitation unchanged.  Patient with continued pain with hip flexion.  At this time, PT recommending that pt return to referring doctor to perform any further exams/treatment/imaging that he deems appropriate. Pt to follow up for PT following MD visit. Pt in agreement with POC. PT will reach out to Dr. Ayers re plan and for his input. After follow-up with MD pt will benefit from continued PT to work towards remaining goals.        History  Co-morbidities and personal factors that may impact the plan of care Examination  Body Structures and Functions, activity limitations and participation restrictions that may impact the plan of care. Medical necessity is demonstrated by the following impairments. Clinical Presentation Decision Making/ Complexity Score   1. History of LLE weakness 1. Decreased strength  2. Pain  3. Impaired functional mobility  4. Decreased ROM  5. Impaired gait Evolving      moderate high moderate moderate         Medical necessity is demonstrated by the following IMPAIRMENTS/PROMBLEM LIST:    1)Increase in pain level limiting function   2)Core/back and LE weakness   3)Decreased spinal mobility   4)Decreased ability to participate in daily activities   5) Impaired muscle tone   6)Lack of HEP, requires skilled supervision to complete and progress HEP    7) Decreased community ambulation and stair negotiation   8) Decreased ability to participate in vocational pursuits    9)Poor posture   10) decreased activity tolerance/endurance      GOALS: Short Term Goals:  4 weeks  1.Report decreased back  pain  <   / =  5  /10  to increase tolerance for ADLs, sitting, walking, bending/stooping and other functional and work activities- Goal met  2. Pt to increase back ROM to WFL t/o for improved functional mobility.  3. Increased MMT  By 1/2 grade  to increase tolerance for ADL, functional and work activities, as well  as improved posture.- Goal partially met  4. Pt to report ability to sit for 1 hour without back pain for improved ability to complete work tasks.  5. Pt to tolerate HEP to improve ROM and independence with ADL's  6. Pt will demo ability to comfortably reach her LE's for improved ability to perform lower body ADL's.  7. Pt will negotiate 1 flight of stairs with Mod I for improved community access.    Long Term Goals: 8 weeks  1.Report decreased    back  pain  <   / =  3  /10  to increase tolerance for  ADLs, sitting, walking, bending/stooping and other functional and work activities  2.Pt to demo ability to stoop/squat to  item off of the floor with proper body mechanics and no pain for improved functional mobility  3.Increased MMT by 1 grade  to increase tolerance for ADL and work activities,as well as for improved posture  4.Pt will report at CJ level (20-40% impaired) on FOTO to demonstrate decrease in disability and improvement in back pain.  5. Pt to be Independent with HEP to improve ROM and independence with ADL's  6. Pt to report being able to stand/walk for >/=80min for improved functional mobility and community access      Plan     Pt will be treated by physical therapy 2 times a weekly for Pt Education, HEP, therapeutic exercises, neuromuscular re-education, therapeutic activities, manual therapy, joint mobilizations, and modalities PRN to achieve established goals. Lelia may at times be seen by a PTA as part of the Rehab Team.

## 2017-09-15 NOTE — PLAN OF CARE
Name: Lelia Mendoza  Clinic Number: [id number]  Diagnosis:   Encounter Diagnoses   Name Primary?    Chronic left-sided low back pain with left-sided sciatica     Decreased strength     Poor posture     Numbness and tingling of left leg     Impaired functional mobility and activity tolerance     Gait abnormality     Decreased ROM of lower extremity     Decreased ROM of lumbar spine      Physician: Dario Ayers MD  Treatment Orders: PT Eval and Treat    Past Medical History:   Diagnosis Date    Hyperlipidemia LDL goal < 130     Hypertension     Migraine     over 10 years but not so bad lately    PHONG on CPAP     2015         Evaluation Date: 17  Visit # 5 authorized: 25  POC period: 17-10/12/17  Time In: 17:15 (pt with late arrival)  Time Out: 18:10      Subjective/History     Patient reports no burning in the back.  Patient reports her L anterior thigh pain was worse this morning but her pain tends to be more in the mornings.  Overall, pt states she feels like symptoms haven't changed since starting therapy. Pt continues to report difficulty with lower body dressing, difficulty getting LLE in and out of the car, difficulty getting in and out of bathtub, etc    Pain: 3-5/10 in L hip thigh and hip (mostly at front and lateral aspect of her hip), pain travels into groin at times    Objective     Observation: pt received amb without a device    Pt received moist heat to L low back with wedge under knees x 10 min at the beginning of the session. Pt reports some pain relief with intervention    Lelia received therapeutic exercises to develop strength, ROM, flexibility and core stabilization for 35 minutes includin:1 therex x 18 min    Lumbar Range of Motion:     Degrees Pain   Flexion WFL no       Extension 2/3 range Yes- into L hip       Left Side Bending WFL Yes- down L gluteal region       Right Side Bending WFL no       Left rotation WFL No        Right Rotation WFL No             Lower  "Extremity Strength  Right LE   Left LE     Knee extension: 5/5 Knee extension: 5/5   Knee flexion: 5/5 Knee flexion: 5/5   Hip flexion: 5/5 Hip flexion: 3-/5   Hip extension:  >/=3/5 Hip extension: >/=3/5   Hip abduction: 5/5 Hip abduction: 5/5   Hip adduction: 5/5 Hip adduction 5/5   Ankle dorsiflexion: 5/5 Ankle dorsiflexion: 5/5   Ankle plantarflexion: 5/5 Ankle plantarflexion: 5/5         Seated B hamstring stretches, 2 x 30"/side  Seated L LAQs, 2 x 10 reps  Seated sciatic nerve glides x 20  Glut sets in supine with wedge under knees x 20  Hip adduction with ball in supine x 20, 3" holds  Gentle L hip distraction in supine for improved hip mechanics*  Gentle B hip distraction in supine*   *pt reports pain relief with both exercises    Not performed today:  Hip abduction with green theraband in supine with pelvic tilt x 30  Lateral hip glides x 4 minutes  Ankle dorsiflexion in supine with wedge under knees x 30  Posterior pelvic tilts in supine with wedge under knees x 30    Pt participated in therapeutic activity for improved functional mobility x 5 min  Pt performs 5 reps of sit <> stand c/ Mod I.  Pt performs 3 reps of supine <> sit c/ S- CGA (intermittent support at LLE for pain relief).     Instructed pt. Regarding all exercises. Pt provided none added. Proper technique with all exercises. Pt was advised to perform these exercises free of pain, and to stop performing them if pain occurs. Pt demo good understanding of the education provided. Lelia demonstrated good return demonstration of activities.          Assessment     Patient tolerated treatment fair with reports of decreased hip pain following distraction. Pt with slightly improved lumbar ROM and has some reduced back pain with ROM. Pain with some reduced pain overall. However, pt has difficulty tolerating most exercises/activities as well as positions for special tests to further assess hip. Given some of pt's symptoms and pain presentation, it " appears that symptoms and impaired mobility are likely due to L hip pathology. Pt's functional limitation unchanged.  Patient with continued pain with hip flexion.  At this time, PT recommending that pt return to referring doctor to perform any further exams/treatment/imaging that he deems appropriate. Pt to follow up for PT following MD visit. Pt in agreement with POC. PT will reach out to Dr. Ayers re plan and for his input. After follow-up with MD pt will benefit from continued PT to work towards remaining goals.        History  Co-morbidities and personal factors that may impact the plan of care Examination  Body Structures and Functions, activity limitations and participation restrictions that may impact the plan of care. Medical necessity is demonstrated by the following impairments. Clinical Presentation Decision Making/ Complexity Score   1. History of LLE weakness 1. Decreased strength  2. Pain  3. Impaired functional mobility  4. Decreased ROM  5. Impaired gait Evolving      moderate high moderate moderate         Medical necessity is demonstrated by the following IMPAIRMENTS/PROMBLEM LIST:    1)Increase in pain level limiting function   2)Core/back and LE weakness   3)Decreased spinal mobility   4)Decreased ability to participate in daily activities   5) Impaired muscle tone   6)Lack of HEP, requires skilled supervision to complete and progress HEP    7) Decreased community ambulation and stair negotiation   8) Decreased ability to participate in vocational pursuits    9)Poor posture   10) decreased activity tolerance/endurance      GOALS: Short Term Goals:  4 weeks  1.Report decreased back  pain  <   / =  5  /10  to increase tolerance for ADLs, sitting, walking, bending/stooping and other functional and work activities- Goal met  2. Pt to increase back ROM to WFL t/o for improved functional mobility.  3. Increased MMT  By 1/2 grade  to increase tolerance for ADL, functional and work activities, as well  as improved posture.- Goal partially met  4. Pt to report ability to sit for 1 hour without back pain for improved ability to complete work tasks.  5. Pt to tolerate HEP to improve ROM and independence with ADL's  6. Pt will demo ability to comfortably reach her LE's for improved ability to perform lower body ADL's.  7. Pt will negotiate 1 flight of stairs with Mod I for improved community access.    Long Term Goals: 8 weeks  1.Report decreased    back  pain  <   / =  3  /10  to increase tolerance for  ADLs, sitting, walking, bending/stooping and other functional and work activities  2.Pt to demo ability to stoop/squat to  item off of the floor with proper body mechanics and no pain for improved functional mobility  3.Increased MMT by 1 grade  to increase tolerance for ADL and work activities,as well as for improved posture  4.Pt will report at CJ level (20-40% impaired) on FOTO to demonstrate decrease in disability and improvement in back pain.  5. Pt to be Independent with HEP to improve ROM and independence with ADL's  6. Pt to report being able to stand/walk for >/=80min for improved functional mobility and community access      Plan     Pt will be treated by physical therapy 2 times a weekly for Pt Education, HEP, therapeutic exercises, neuromuscular re-education, therapeutic activities, manual therapy, joint mobilizations, and modalities PRN to achieve established goals. Lelia may at times be seen by a PTA as part of the Rehab Team.

## 2017-09-20 ENCOUNTER — OFFICE VISIT (OUTPATIENT)
Dept: FAMILY MEDICINE | Facility: HOSPITAL | Age: 52
End: 2017-09-20
Attending: FAMILY MEDICINE
Payer: COMMERCIAL

## 2017-09-20 VITALS
DIASTOLIC BLOOD PRESSURE: 99 MMHG | HEART RATE: 49 BPM | HEIGHT: 60 IN | BODY MASS INDEX: 41.25 KG/M2 | WEIGHT: 210.13 LBS | SYSTOLIC BLOOD PRESSURE: 147 MMHG

## 2017-09-20 DIAGNOSIS — M25.552 PAIN OF LEFT HIP JOINT: Primary | ICD-10-CM

## 2017-09-20 PROCEDURE — 99213 OFFICE O/P EST LOW 20 MIN: CPT | Performed by: FAMILY MEDICINE

## 2017-09-20 RX ORDER — DICLOFENAC SODIUM 10 MG/G
2 GEL TOPICAL 4 TIMES DAILY
Qty: 100 G | Refills: 1 | Status: SHIPPED | OUTPATIENT
Start: 2017-09-20 | End: 2019-04-26

## 2017-09-20 NOTE — LETTER
September 20, 2017      [facility] [address]  [place] [address]  Phone: [phone]  Fax: [phone]       Patient: Lelia Mendoza   YOB: 1965  Date of Visit: 09/20/2017    To Whom It May Concern:    Stephanie Mendoza  was at [facility] on 09/20/2017. She may return to work/school on 9/21/17 with no restrictions. If you have any questions or concerns, or if I can be of further assistance, please do not hesitate to contact me.  She was not medically able to be at work since  9/14/17.     Sincerely,    Dario Ayers MD

## 2017-09-20 NOTE — PROGRESS NOTES
Subjective:       Patient ID: Lelia Mendoza is a 52 y.o. female.    Chief Complaint: Leg Pain    Patient is a 53 yo female with a pmhx of obesity, gastric sleeve, HLD, HTN that presents with chronic left hip pain that has been evaluated at last appointment. Xray of hop shows decrease cartiladge at femoral head but no signs of fracture. Patient has been evaluated by PT who has stated patient needs to be seen by myself before continuing PT. Patient has not loss sensation in lower extremity is able to bear weight. States that pain is 10/10 no matter position. Has had to take several days off from work due to the pain, does not stand at work.       Review of Systems   Constitutional: Negative for chills and fever.   HENT: Negative for congestion and sore throat.    Eyes: Negative for visual disturbance.   Respiratory: Negative for cough, choking, chest tightness and shortness of breath.    Cardiovascular: Negative for chest pain.   Gastrointestinal: Negative for abdominal pain, constipation, diarrhea, nausea and vomiting.   Endocrine: Negative for polyuria.   Genitourinary: Negative for dysuria.   Musculoskeletal: Negative for back pain.   Neurological: Negative for dizziness, weakness and headaches.       Objective:      Vitals:    09/20/17 1004   BP: (!) 147/99   Pulse: (!) 49     Physical Exam   Constitutional: She is oriented to person, place, and time. She appears well-developed and well-nourished.   HENT:   Head: Normocephalic and atraumatic.   Neck: Normal range of motion. Neck supple.   Cardiovascular: Normal rate, regular rhythm, normal heart sounds and intact distal pulses.  Exam reveals no gallop and no friction rub.    No murmur heard.  Pulmonary/Chest: Effort normal and breath sounds normal. No respiratory distress. She has no wheezes. She has no rales. She exhibits no tenderness.   Abdominal: Soft. Bowel sounds are normal. There is no tenderness.   Musculoskeletal: Normal range of motion. She exhibits no  edema or deformity.   Point tenderness to L trochanter, no paraspinal tenderness, positive straight leg test in L leg, strength intact b/l.   Neurological: She is alert and oriented to person, place, and time.   Skin: Skin is warm and dry.   Nursing note and vitals reviewed.      Assessment:       1. Pain of left hip joint        Plan:       Pain of left hip joint  -     diclofenac sodium 1 % Gel; Apply 2 g topically 4 (four) times daily.  Dispense: 100 g; Refill: 1      No Follow-up on file.

## 2017-09-26 ENCOUNTER — CLINICAL SUPPORT (OUTPATIENT)
Dept: REHABILITATION | Facility: HOSPITAL | Age: 52
End: 2017-09-26
Attending: FAMILY MEDICINE
Payer: COMMERCIAL

## 2017-09-26 DIAGNOSIS — R20.0 NUMBNESS AND TINGLING OF LEFT LEG: ICD-10-CM

## 2017-09-26 DIAGNOSIS — R20.2 NUMBNESS AND TINGLING OF LEFT LEG: ICD-10-CM

## 2017-09-26 DIAGNOSIS — Z74.09 IMPAIRED FUNCTIONAL MOBILITY AND ACTIVITY TOLERANCE: ICD-10-CM

## 2017-09-26 DIAGNOSIS — G89.29 CHRONIC LEFT-SIDED LOW BACK PAIN WITH LEFT-SIDED SCIATICA: ICD-10-CM

## 2017-09-26 DIAGNOSIS — R53.1 DECREASED STRENGTH: ICD-10-CM

## 2017-09-26 DIAGNOSIS — M54.42 CHRONIC LEFT-SIDED LOW BACK PAIN WITH LEFT-SIDED SCIATICA: ICD-10-CM

## 2017-09-26 DIAGNOSIS — R26.9 GAIT ABNORMALITY: ICD-10-CM

## 2017-09-26 DIAGNOSIS — R29.3 POOR POSTURE: ICD-10-CM

## 2017-09-26 DIAGNOSIS — M53.86 DECREASED ROM OF LUMBAR SPINE: ICD-10-CM

## 2017-09-26 DIAGNOSIS — M25.669 DECREASED ROM OF LOWER EXTREMITY: ICD-10-CM

## 2017-09-26 PROCEDURE — 97110 THERAPEUTIC EXERCISES: CPT | Mod: PO

## 2017-09-26 PROCEDURE — 97116 GAIT TRAINING THERAPY: CPT | Mod: PO

## 2017-09-26 NOTE — PROGRESS NOTES
"Name: Lelia Mendoza  Clinic Number: [id number]  Diagnosis:   Encounter Diagnoses   Name Primary?    Chronic left-sided low back pain with left-sided sciatica     Decreased strength     Poor posture     Numbness and tingling of left leg     Impaired functional mobility and activity tolerance     Gait abnormality     Decreased ROM of lower extremity     Decreased ROM of lumbar spine      Physician: Dario Ayers MD  Treatment Orders: PT Eval and Treat    Past Medical History:   Diagnosis Date    Hyperlipidemia LDL goal < 130     Hypertension     Migraine     over 10 years but not so bad lately    PHONG on CPAP     6/2015         Evaluation Date: 8/17/17  Visit # 6 authorized: 25  POC period: 8/17/17-10/12/17  Time In: 17:05   Time Out: 18:05      Subjective/History     Patient reports she feels the same. She saw Dr. Ayers and he is recommending she see an orthopedic doctor for her hip pain.  Pain: 5/10 in L hip thigh and hip (mostly at front and lateral aspect of her hip), pain travels into groin at times    Objective     Observation: pt received amb without a device    Pt received moist heat to L low back with pillow under knees x 10 min at the beginning of the session. Pt reports some pain relief with intervention    Lelia received therapeutic exercises to develop strength, ROM, flexibility and core stabilization for 40 minutes including:      Gentle L hip distraction in supine for improved hip mechanics- pt reports only mild pain relief with activity today  With pt in supine, R knee bent and LLE supported by therapist, pt performs 2 x 10 reps, 3" holds of PT resisted isometrics on LLE in the following directions:  -Hip Flexion  -Hip ABD  -Hip ADD  -Knee flexion  -knee extension  2 x 10 reps of glute sets, 3" holds, RLE in hook-lying position, LLE supported by PT  PT utilizes roller along anterior and lateral aspect of L thigh for improved muscle quality/reduce pain x 2 min  L HS stretch x 1min with PT " supporting LLE (pain free range- pt with reproduction of symptoms, if LLE is elevated too high in SLR)  In supine, with PT supporting LLE in comfortable HS stretch, pt performs 3 x 10 reps of ankle pumps    Not performed today:  Hip abduction with green theraband in supine with pelvic tilt x 30  Lateral hip glides x 4 minutes  Ankle dorsiflexion in supine with wedge under knees x 30  Posterior pelvic tilts in supine with wedge under knees x 30    Patient participated in gait training activities to normalize gait pattern for 8 minutes. The following activities were included:   PT adjusts straight cane (SC) to pt's height and educated pt on use/sequencing of gait with cane. Pt amb x ~80 feet total within gym with SC and S. Initially pt utilizes step-to gait pattern, but as pt becomes more comfortable with device, she is able to ambulate reciprocally. Pt needs occasional cues for sequencing/cane placement with good carryover. Pt ta's decreased L arm swing. Pt with mild antalgic gait, but she reports reduced pain in L hip with cane use.  PT also educated pt on gait sequencing on stairs. Pt negotiates 4 steps with SC and S, step-to gait pattern, cues for sequencing (ie up with the good, down with the bad). Pt needs increased time, but no LOB. Pt reports reduced pain with technique.  Pt reports her mom has a cane she can use. Pt was educated on how to properly adjust the SC at home.     Instructed pt. On HEP: glute sets, quad sets and seated sciatic nerve glide. Pt with good understanding. PT also educated pt on benefit of SC and potential benefit of aquatic therapy to reduce weight-bearing and increase pt's tolerance for exercise. Pt ta's good understanding of the education provided. Lelia demonstrated good return demonstration of activities.          Assessment     Patient tolerated treatment well and was able to tolerate manually resisted isometrics today. Pt also demo's reduced pain with ambulation and stairs with  use of SC- pt may need encouragement to use the cane.  It appears that much of pt's pain is related to L hip subchondral sclerosis with osteophytes, and she may benefit from aquatic therapy- will follow up with pt regarding interest in this.  Per pt, Dr. Ayers is recommending an ortho consult for pt. In the meantime, PT will continue to progress pt as tolerated. Pt is pleasant and motivated to improve her mobility and reduce pain. Continue PT POC.          Medical necessity is demonstrated by the following IMPAIRMENTS/PROMBLEM LIST:    1)Increase in pain level limiting function   2)Core/back and LE weakness   3)Decreased spinal mobility   4)Decreased ability to participate in daily activities   5) Impaired muscle tone   6)Lack of HEP, requires skilled supervision to complete and progress HEP    7) Decreased community ambulation and stair negotiation   8) Decreased ability to participate in vocational pursuits    9)Poor posture   10) decreased activity tolerance/endurance      GOALS: Short Term Goals:  4 weeks  1.Report decreased back  pain  <   / =  5  /10  to increase tolerance for ADLs, sitting, walking, bending/stooping and other functional and work activities- Goal met  2. Pt to increase back ROM to WFL t/o for improved functional mobility.  3. Increased MMT  By 1/2 grade  to increase tolerance for ADL, functional and work activities, as well as improved posture.- Goal partially met  4. Pt to report ability to sit for 1 hour without back pain for improved ability to complete work tasks.  5. Pt to tolerate HEP to improve ROM and independence with ADL's  6. Pt will demo ability to comfortably reach her LE's for improved ability to perform lower body ADL's.  7. Pt will negotiate 1 flight of stairs with Mod I for improved community access.    Long Term Goals: 8 weeks  1.Report decreased    back  pain  <   / =  3  /10  to increase tolerance for  ADLs, sitting, walking, bending/stooping and other functional and work  activities  2.Pt to demo ability to stoop/squat to  item off of the floor with proper body mechanics and no pain for improved functional mobility  3.Increased MMT by 1 grade  to increase tolerance for ADL and work activities,as well as for improved posture  4.Pt will report at CJ level (20-40% impaired) on FOTO to demonstrate decrease in disability and improvement in back pain.  5. Pt to be Independent with HEP to improve ROM and independence with ADL's  6. Pt to report being able to stand/walk for >/=80min for improved functional mobility and community access      Plan     Pt will be treated by physical therapy 2 times a weekly for Pt Education, HEP, therapeutic exercises, neuromuscular re-education, therapeutic activities, manual therapy, joint mobilizations, and modalities PRN to achieve established goals. Lelia may at times be seen by a PTA as part of the Rehab Team.

## 2017-10-12 ENCOUNTER — OFFICE VISIT (OUTPATIENT)
Dept: FAMILY MEDICINE | Facility: HOSPITAL | Age: 52
End: 2017-10-12
Attending: FAMILY MEDICINE
Payer: COMMERCIAL

## 2017-10-12 VITALS
HEART RATE: 66 BPM | SYSTOLIC BLOOD PRESSURE: 146 MMHG | WEIGHT: 209.19 LBS | HEIGHT: 60 IN | DIASTOLIC BLOOD PRESSURE: 93 MMHG | BODY MASS INDEX: 41.07 KG/M2

## 2017-10-12 DIAGNOSIS — Z98.84 S/P LAPAROSCOPIC SLEEVE GASTRECTOMY: ICD-10-CM

## 2017-10-12 DIAGNOSIS — R20.2 NUMBNESS AND TINGLING OF LEFT LEG: ICD-10-CM

## 2017-10-12 DIAGNOSIS — Z00.00 PREVENTATIVE HEALTH CARE: ICD-10-CM

## 2017-10-12 DIAGNOSIS — Z01.419 GYNECOLOGIC EXAM NORMAL: Primary | ICD-10-CM

## 2017-10-12 DIAGNOSIS — R20.0 NUMBNESS AND TINGLING OF LEFT LEG: ICD-10-CM

## 2017-10-12 DIAGNOSIS — Z83.719 FAMILY HISTORY OF COLONIC POLYPS: ICD-10-CM

## 2017-10-12 PROCEDURE — 99213 OFFICE O/P EST LOW 20 MIN: CPT | Performed by: STUDENT IN AN ORGANIZED HEALTH CARE EDUCATION/TRAINING PROGRAM

## 2017-10-12 NOTE — PROGRESS NOTES
Subjective:       Patient ID: Lelia Mendoza is a 52 y.o. female.    Chief Complaint: papsmear    HPI   Pt is 51 yo female w/ hx of lap gastric sleeve, HLD, HTN, chronic left leg numbness presents for Pap smear.  She typically follows with Dr. Ayers for he primary care needs but requested a female physican for a Pap smear.    She has hx of partial hysterectomy (2006), single-sided oopherectomy (2008) and does have hot flashes/ night-sweats since having her ovary removed.  No abnormal bleeding, no dyspareunia, minimal white-yellow discharge without itching.  She denies having an abnormal pap smear and denies any hx of STD's.  No fam member wih cervical cancer though her mother had uterine cancer.    She does need a mammogram, had a 2014 colonoscopy with a tubular adenoma discovered (repeat in 2019). Uses estrogen cream for vaginal dryness, works well. Low libido.    Review of Systems   Constitutional: Negative for appetite change, chills, diaphoresis, fever and unexpected weight change.   HENT: Negative for congestion, rhinorrhea and trouble swallowing.    Eyes: Negative for visual disturbance.   Respiratory: Negative for chest tightness, shortness of breath and wheezing.    Cardiovascular: Negative for chest pain and palpitations.   Gastrointestinal: Negative for abdominal distention, abdominal pain, constipation, diarrhea, nausea and vomiting.   Genitourinary: Negative for decreased urine volume, difficulty urinating, dyspareunia, frequency, hematuria, vaginal bleeding, vaginal discharge and vaginal pain.   Musculoskeletal: Negative for arthralgias, back pain and joint swelling.   Skin: Negative for wound.   Neurological: Negative for dizziness, seizures, weakness and light-headedness.   Psychiatric/Behavioral: Negative for agitation and decreased concentration.   All other systems reviewed and are negative.      Objective:      Vitals:    10/12/17 0858   BP: (!) 146/93   Pulse: 66     Physical Exam    Constitutional: She is oriented to person, place, and time. She appears well-developed and well-nourished. No distress.   HENT:   Head: Normocephalic and atraumatic.   Mouth/Throat: No oropharyngeal exudate.   Eyes: Conjunctivae and EOM are normal. Pupils are equal, round, and reactive to light.   Neck: Normal range of motion. Neck supple. No JVD present.   Cardiovascular: Normal rate, regular rhythm and normal heart sounds.    No murmur heard.  Pulmonary/Chest: Effort normal and breath sounds normal. No respiratory distress. She has no wheezes. She has no rales.   Abdominal: Soft. Bowel sounds are normal. She exhibits no distension and no mass. There is no tenderness.   Genitourinary: Vagina normal.   Genitourinary Comments: Thin white discharge, no odor, cervix without erythema, gross abnormalities, Pap, HPV sent out   Musculoskeletal: Normal range of motion. She exhibits no edema.   Neurological: She is alert and oriented to person, place, and time. No cranial nerve deficit.   Skin: Capillary refill takes less than 2 seconds. She is not diaphoretic.   Psychiatric: She has a normal mood and affect. Her behavior is normal.   Nursing note and vitals reviewed.      Assessment:       1. Gynecologic exam normal    2. S/P laparoscopic sleeve gastrectomy    3. Obesity, Class II, BMI 35-39.9, with comorbidity    4. Family history of colonic polyps    5. Numbness and tingling of left leg        Plan:       Gynecologic exam normal      -  Pap, HPV sent out      -  Call if any problems develop or with any questions.    Obesity, s/p gastric sleeve       -  Counseled on diet & exercise    Family hx of colonic polyps       -  Colonoscopy in 2014 with tubular adenoma, repeat in 2019    Mammogram        - Ordered today (last done in 2015- normal)    Numbness and tingling of left leg        -  Stable, Ortho referral in process, per pt    Return in about 3 months (around 1/12/2018).

## 2017-10-19 NOTE — PROGRESS NOTES
I was present in clinic when the patient was seen and I discussed the case with Dr. Adams.  I have reviewed and agree with the assessment and plan. I examined the patient in the room with Dr. Adams.

## 2017-10-31 ENCOUNTER — TELEPHONE (OUTPATIENT)
Dept: FAMILY MEDICINE | Facility: HOSPITAL | Age: 52
End: 2017-10-31

## 2017-11-12 ENCOUNTER — OFFICE VISIT (OUTPATIENT)
Dept: URGENT CARE | Facility: CLINIC | Age: 52
End: 2017-11-12
Payer: COMMERCIAL

## 2017-11-12 VITALS
HEIGHT: 63 IN | DIASTOLIC BLOOD PRESSURE: 99 MMHG | HEART RATE: 50 BPM | WEIGHT: 210 LBS | RESPIRATION RATE: 16 BRPM | OXYGEN SATURATION: 98 % | SYSTOLIC BLOOD PRESSURE: 170 MMHG | TEMPERATURE: 99 F | BODY MASS INDEX: 37.21 KG/M2

## 2017-11-12 DIAGNOSIS — S83.91XA SPRAIN OF RIGHT KNEE, UNSPECIFIED LIGAMENT, INITIAL ENCOUNTER: Primary | ICD-10-CM

## 2017-11-12 PROCEDURE — 99213 OFFICE O/P EST LOW 20 MIN: CPT | Mod: S$GLB,,, | Performed by: FAMILY MEDICINE

## 2017-11-12 NOTE — PATIENT INSTRUCTIONS
Knee Sprain    A sprain is an injury to the ligaments or capsule that holds a joint together. There are no broken bones. Most sprains take 3 to 6 weeks to heal. If it a severe sprain where the ligament is completely torn, it can take months to recover.  Most knee sprains are treated with a splint, knee immobilizer brace, or elastic wrap for support. Severe sprains may require surgery.  Home care  · Stay off the injured leg as much as possible until you can walk on it without pain. If you have a lot of pain with walking, crutches or a walker may be prescribed. (These can be rented or purchased at many pharmacies and surgical or orthopedic supply stores). Follow your healthcare provider's advice about when to begin putting weight on that leg.  · Keep your leg elevated to reduce pain and swelling. When sleeping, place a pillow under the injured leg. When sitting, support the injured leg so it is level with your waist. This is very important during the first 48 hours.  · Apply an ice pack over the injured area for 15 to 20 minutes every 3 to 6 hours. You should do this for the first 24 to 48 hours. You can make an ice pack by filling a plastic bag that seals at the top with ice cubes and then wrapping it with a thin towel. Continue to use ice packs for relief of pain and swelling as needed. As the ice melts, be careful to avoid getting your wrap, splint, or cast wet. After 48 hours, apply heat (warm shower or warm bath) for 15 to 20 minutes several times a day, or alternate ice and heat. You can place the ice pack directly over the splint. If you have to wear a hook-and-loop knee brace, you can open it to apply the ice pack, or heat, directly to the knee. Never put ice directly on the skin. Always wrap the ice in a towel or other type of cloth.  · You may use over-the-counter pain medicine to control pain, unless another pain medicine was prescribed.If you have chronic liver or kidney disease or ever had a stomach  ulcer or GI bleeding, talk with your healthcare provider before using these medicines.  · If you were given a splint, keep it completely dry at all times. Bathe with your splint out of the water, protected with 2 large plastic bags, rubber-banded at the top end. If a fiberglass splint gets wet, you can dry it with a hair dryer. If you have a hook-and-loop knee brace, you can remove this to bathe, unless told otherwise.  Follow-up care  Follow up with your doctor as advised. Any X-rays you had today dont show any broken bones, breaks, or fractures. Sometimes fractures dont show up on the first X-ray. Bruises and sprains can sometimes hurt as much as a fracture. These injuries can take time to heal completely. If your symptoms dont improve or they get worse, talk with your doctor. You may need a repeat X-ray. If X-rays were taken, you will be told of any new findings that may affect your care.  Call 911  Call 911 if you have:  ·  Shortness of breath  ·  Chest pain  When to seek medical advice  Call your healthcare provider right away if any of these occur:  · The splint or knee immobilizer brace becomes wet or soft  · The fiberglass cast or splint remains wet for more than 24 hours  · Pain or swelling increases  · The injured leg or toes become cold, blue, numb, or tingly  Date Last Reviewed: 11/20/2015  © 6883-5263 [employer]. [address] [place] [address], [place] [address]. All rights reserved. This information is not intended as a substitute for professional medical care. Always follow your healthcare professional's instructions.

## 2017-11-12 NOTE — PROGRESS NOTES
"Subjective:       Patient ID: Lelia Mendoza is a 52 y.o. female.    Vitals:  height is 5' 3" (1.6 m) and weight is 95.3 kg (210 lb). Her oral temperature is 98.5 °F (36.9 °C). Her blood pressure is 170/99 (abnormal) and her pulse is 50 (abnormal). Her respiration is 16 and oxygen saturation is 98%.  Pt did not take blood pressure medication for 2 days.  Chief Complaint: Leg Pain    Right tib/fib pain that started yesterday, no trauma.  Pt states back of right leg feels tight and swollen.  Pt also states rt knee pain, not trauma. Pt has tried nothing otc for pain.      Leg Pain    The incident occurred 12 to 24 hours ago. The incident occurred at home. There was no injury mechanism. The pain is present in the right leg. The pain is at a severity of 4/10. The pain is mild. The pain has been constant since onset. She reports no foreign bodies present. The symptoms are aggravated by weight bearing. She has tried nothing for the symptoms.     Review of Systems   Constitution: Negative for chills and fever.   HENT: Negative for sore throat.    Eyes: Negative for blurred vision.   Cardiovascular: Negative for chest pain.   Respiratory: Negative for shortness of breath.    Skin: Negative for rash.   Musculoskeletal: Positive for joint pain and joint swelling. Negative for back pain.   Gastrointestinal: Negative for abdominal pain, diarrhea, nausea and vomiting.   Neurological: Negative for headaches.   Psychiatric/Behavioral: The patient is not nervous/anxious.        Objective:      Physical Exam   Constitutional: She appears well-developed and well-nourished.   Cardiovascular: Normal rate and regular rhythm.    Pulmonary/Chest: Effort normal and breath sounds normal.   Abdominal: Soft.   Musculoskeletal: She exhibits no edema, tenderness (no calf tenderness noted bilaterally. ) or deformity (Knee with FROM without difficulty, tenderness lateral aspect of knee noted).   Nursing note and vitals reviewed.      Assessment: "       1. Sprain of right knee, unspecified ligament, initial encounter        Plan:         Sprain of right knee, unspecified ligament, initial encounter  -     X-Ray Knee 3 View Right; Future; Expected date: 11/12/2017    Discussed tylenol OTC and need to continue her care with orthopedist

## 2017-12-22 ENCOUNTER — DOCUMENTATION ONLY (OUTPATIENT)
Dept: REHABILITATION | Facility: HOSPITAL | Age: 52
End: 2017-12-22

## 2017-12-22 NOTE — PROGRESS NOTES
PHYSICAL THERAPY  Discharge  Date of Discharge 12/22/2017    Name: Lelia [facility] #: [id number]    Pt was seen in Physical Therapy for initial evaluation on: 8/18/17  Pt's last date of treatment in Physical Therapy was: 9/26/17  Number of treatment sessions completed: 6  Reason for discharge:    self discharge/reason unknown  Status at Discharge:  Goals not met     Discharge update in functional G code not available due to unplanned discharge.

## 2018-03-09 ENCOUNTER — HOSPITAL ENCOUNTER (OUTPATIENT)
Dept: RADIOLOGY | Facility: HOSPITAL | Age: 53
Discharge: HOME | End: 2018-03-09
Attending: FAMILY MEDICINE
Payer: COMMERCIAL

## 2018-03-09 ENCOUNTER — OFFICE VISIT (OUTPATIENT)
Dept: FAMILY MEDICINE | Facility: HOSPITAL | Age: 53
End: 2018-03-09
Attending: FAMILY MEDICINE
Payer: COMMERCIAL

## 2018-03-09 VITALS
SYSTOLIC BLOOD PRESSURE: 145 MMHG | WEIGHT: 213.38 LBS | HEART RATE: 55 BPM | HEIGHT: 60 IN | BODY MASS INDEX: 41.89 KG/M2 | DIASTOLIC BLOOD PRESSURE: 92 MMHG

## 2018-03-09 DIAGNOSIS — M71.9 BURSITIS, UNSPECIFIED SITE: Primary | ICD-10-CM

## 2018-03-09 DIAGNOSIS — I10 ESSENTIAL HYPERTENSION: ICD-10-CM

## 2018-03-09 DIAGNOSIS — M71.9 BURSITIS, UNSPECIFIED SITE: ICD-10-CM

## 2018-03-09 PROCEDURE — 76882 US LMTD JT/FCL EVL NVASC XTR: CPT | Mod: TC

## 2018-03-09 PROCEDURE — 99214 OFFICE O/P EST MOD 30 MIN: CPT | Performed by: FAMILY MEDICINE

## 2018-03-09 PROCEDURE — 76882 US LMTD JT/FCL EVL NVASC XTR: CPT | Mod: 26,,, | Performed by: RADIOLOGY

## 2018-03-09 RX ORDER — AMLODIPINE BESYLATE 10 MG/1
10 TABLET ORAL DAILY
Qty: 90 TABLET | Refills: 2 | Status: SHIPPED | OUTPATIENT
Start: 2018-03-09 | End: 2020-04-20 | Stop reason: SDUPTHER

## 2018-03-09 RX ORDER — GABAPENTIN 300 MG/1
600 CAPSULE ORAL 3 TIMES DAILY
Qty: 180 CAPSULE | Refills: 11 | Status: SHIPPED | OUTPATIENT
Start: 2018-03-09 | End: 2020-06-08

## 2018-03-09 NOTE — PROGRESS NOTES
Subjective:       Patient ID: Lelia Mendoza is a 52 y.o. female.    Chief Complaint: Leg Pain    Patient presents to the clinic with complaints of ongoing left hip pain, patient has had xray that showed DJD in her left hip, has completed some PT but not in entirely. Patient complains that her pain is limited to the lateral aspect of left hip, cannot sleep on that side without exacerbating pain, has difficult moving hip in all directions, getting up out of chair and walking up stairs.       Review of Systems   Constitutional: Negative for chills and fever.   HENT: Negative for congestion and sore throat.    Eyes: Negative for visual disturbance.   Respiratory: Negative for cough, choking, chest tightness and shortness of breath.    Cardiovascular: Negative for chest pain.   Gastrointestinal: Negative for abdominal pain, constipation, diarrhea, nausea and vomiting.   Endocrine: Negative for polyuria.   Genitourinary: Negative for dysuria.   Musculoskeletal: Positive for arthralgias and gait problem. Negative for back pain, neck pain and neck stiffness.   Neurological: Negative for dizziness, weakness and headaches.       Objective:      Vitals:    03/09/18 1045   BP: (!) 145/92   Pulse: (!) 55     Physical Exam   Constitutional: She is oriented to person, place, and time. She appears well-developed and well-nourished.   HENT:   Head: Normocephalic and atraumatic.   Neck: Normal range of motion. Neck supple.   Cardiovascular: Normal rate, regular rhythm, normal heart sounds and intact distal pulses.  Exam reveals no gallop and no friction rub.    No murmur heard.  Pulmonary/Chest: Effort normal and breath sounds normal. No respiratory distress. She has no wheezes. She has no rales. She exhibits no tenderness.   Abdominal: Soft. Bowel sounds are normal. There is no tenderness.   Musculoskeletal: She exhibits no deformity.        Left hip: She exhibits decreased range of motion and tenderness. She exhibits normal  strength, no crepitus, no deformity and no laceration.   Neurological: She is alert and oriented to person, place, and time. She displays normal reflexes. No sensory deficit. She exhibits normal muscle tone. Coordination normal.   Skin: Skin is warm and dry.   Nursing note and vitals reviewed.      Assessment:       1. Bursitis, unspecified site    2. Essential hypertension        Plan:       Bursitis, unspecified site  -     US Extremity Non Vascular Limited Left; Future; Expected date: 03/09/2018    Essential hypertension  -     amLODIPine (NORVASC) 10 MG tablet; Take 1 tablet (10 mg total) by mouth once daily.  Dispense: 90 tablet; Refill: 2    Will evaluate for possible trochanteric bursitis, will need injection for treatment

## 2018-03-23 ENCOUNTER — OFFICE VISIT (OUTPATIENT)
Dept: FAMILY MEDICINE | Facility: HOSPITAL | Age: 53
End: 2018-03-23
Attending: FAMILY MEDICINE
Payer: COMMERCIAL

## 2018-03-23 ENCOUNTER — HOSPITAL ENCOUNTER (OUTPATIENT)
Dept: RADIOLOGY | Facility: HOSPITAL | Age: 53
Discharge: HOME | End: 2018-03-23
Attending: STUDENT IN AN ORGANIZED HEALTH CARE EDUCATION/TRAINING PROGRAM
Payer: COMMERCIAL

## 2018-03-23 VITALS
DIASTOLIC BLOOD PRESSURE: 90 MMHG | BODY MASS INDEX: 41.51 KG/M2 | SYSTOLIC BLOOD PRESSURE: 139 MMHG | HEIGHT: 60 IN | HEART RATE: 47 BPM | WEIGHT: 211.44 LBS

## 2018-03-23 DIAGNOSIS — Z12.31 ENCOUNTER FOR SCREENING MAMMOGRAM FOR BREAST CANCER: ICD-10-CM

## 2018-03-23 DIAGNOSIS — M25.552 PAIN OF LEFT HIP JOINT: Primary | ICD-10-CM

## 2018-03-23 DIAGNOSIS — Z00.00 PREVENTATIVE HEALTH CARE: ICD-10-CM

## 2018-03-23 PROCEDURE — 77063 BREAST TOMOSYNTHESIS BI: CPT | Mod: 26,,, | Performed by: RADIOLOGY

## 2018-03-23 PROCEDURE — 99213 OFFICE O/P EST LOW 20 MIN: CPT | Performed by: FAMILY MEDICINE

## 2018-03-23 PROCEDURE — 77067 SCR MAMMO BI INCL CAD: CPT | Mod: 26,,, | Performed by: RADIOLOGY

## 2018-03-23 PROCEDURE — 77067 SCR MAMMO BI INCL CAD: CPT | Mod: TC

## 2018-03-23 RX ORDER — METHYLPREDNISOLONE 4 MG/1
TABLET ORAL
Qty: 1 TABLET | Refills: 0 | Status: SHIPPED | OUTPATIENT
Start: 2018-03-23 | End: 2018-03-23 | Stop reason: CLARIF

## 2018-03-23 NOTE — PROGRESS NOTES
Subjective:       Patient ID: Lelia Mendoza is a 52 y.o. female.    Chief Complaint: Results (ultrasound)    HPI   Lelia Mendoza is a 52yoF who presents with ongoing left hip pain, who had a Left hip US which was inconclusive. She states that she continues to have pain of the left hip and 2/2 not being able to lie on her left side has been lying on her right side and now has pain of the right hip. She is continuing to have pain at rest and during movement of hip in all directions. She also endorses occasional numbness of her legs originating at her hip and expanding down her leg but states that this occurs randomly - not associated with sitting or lying in a certain position.     Review of Systems   Constitutional: Negative for chills, fatigue and fever.   HENT: Negative for congestion and sore throat.    Eyes: Negative for visual disturbance.   Respiratory: Negative for cough, choking, chest tightness and shortness of breath.    Cardiovascular: Negative for chest pain.   Gastrointestinal: Negative for abdominal pain, constipation, diarrhea, nausea and vomiting.   Endocrine: Negative for polyuria.   Genitourinary: Negative for dysuria.   Musculoskeletal: Positive for arthralgias. Negative for back pain.        Bilateral hip pain (L>R). Occasional numbness of legs.    Skin: Negative.  Negative for rash.   Neurological: Negative for dizziness, weakness and headaches.       Objective:      Vitals:    03/23/18 1025   BP: (!) 139/90   Pulse: (!) 47     Physical Exam   Constitutional: She is oriented to person, place, and time. She appears well-developed and well-nourished. No distress.   HENT:   Head: Normocephalic and atraumatic.   Eyes: EOM are normal. Pupils are equal, round, and reactive to light.   Neck: Normal range of motion. Neck supple.   Cardiovascular: Normal rate, regular rhythm, normal heart sounds and intact distal pulses.  Exam reveals no gallop and no friction rub.    No murmur heard.  Pulmonary/Chest:  Effort normal. No respiratory distress.   Abdominal: Soft. Bowel sounds are normal. She exhibits no distension. There is no tenderness.   Musculoskeletal: She exhibits tenderness. She exhibits no edema or deformity.        Left hip: She exhibits decreased range of motion (Decreased Extension 2/2 pain) and tenderness (Tenderness to palpation).   Negative bilateral sitting straight leg raise.    Neurological: She is alert and oriented to person, place, and time.   Skin: Skin is warm and dry.   Nursing note and vitals reviewed.      Assessment:       1. Pain of left hip joint        Plan:       Pain of left hip joint        -     Patient to continue current medication regimen        -     Encouraged patient to maintain activity level (exercising on elliptical), and weight loss         Follow up in 2 weeks for left hip injection   Most likely bursitis

## 2018-03-26 NOTE — PROGRESS NOTES
I assume primary medical responsibility for this patient, I have reviewed the case history, findings, diagnosis and treatment plan with the resident and agree that the care is reasonable and necessary. This service has been performed by a resident without the presence of a teaching physician under the primary care exception  Leatha Thomas  3/26/2018

## 2018-04-09 ENCOUNTER — OFFICE VISIT (OUTPATIENT)
Dept: FAMILY MEDICINE | Facility: HOSPITAL | Age: 53
End: 2018-04-09
Attending: FAMILY MEDICINE
Payer: COMMERCIAL

## 2018-04-09 VITALS
WEIGHT: 208.75 LBS | SYSTOLIC BLOOD PRESSURE: 137 MMHG | HEART RATE: 78 BPM | HEIGHT: 60 IN | DIASTOLIC BLOOD PRESSURE: 91 MMHG | BODY MASS INDEX: 40.98 KG/M2

## 2018-04-09 DIAGNOSIS — M70.62 GREATER TROCHANTERIC BURSITIS OF LEFT HIP: Primary | ICD-10-CM

## 2018-04-09 PROCEDURE — 99213 OFFICE O/P EST LOW 20 MIN: CPT | Performed by: FAMILY MEDICINE

## 2018-04-09 RX ORDER — TRIAMCINOLONE ACETONIDE 40 MG/ML
40 INJECTION, SUSPENSION INTRA-ARTICULAR; INTRAMUSCULAR
Status: DISCONTINUED | OUTPATIENT
Start: 2018-04-09 | End: 2022-08-11 | Stop reason: ALTCHOICE

## 2018-04-09 NOTE — PROGRESS NOTES
Subjective:       Patient ID: Lelia Mendoza is a 52 y.o. female.    Chief Complaint: Hip Pain    HPI Ms. Mendoza is a 52 year old female with past medical history of HTN, HLD, PHONG, s/p laparascopic sleeve gastrectomy presenting to family medicine clinic for left trochanteric bursa injection for ongoing left hip pain. The pain occurs at rest and is worse with movement or lying on her left side. She states that her activities are greatly limited by her pain.    Risks, benefits, alternatives to left trochanteric bursa injections discussed with patient with consents signed. Timeout performed prior to procedure. Aseptic technique maintained throughout. Site marked and dermaplast numbing spray applied. Povidone iodine swab used to sterilize injection site. Solution of 5mL 0.25% Bupivicaine, 4mL 1% lidocaine, and 40mL Kenolog injected at site with 24 gauge needle. Pressure applied following procedure until bleeding stopped. Estimated blood loss 0.5cc. Patient advised to contact clinic should she feel dizziness, weakness, chest pain, shortness of breath, or any concerning symptoms.     Review of Systems   Constitutional: Negative for chills, fever and unexpected weight change.   Respiratory: Negative for cough, shortness of breath and wheezing.    Cardiovascular: Negative for chest pain.   Gastrointestinal: Negative for abdominal pain, blood in stool, constipation, diarrhea, nausea and vomiting.   Endocrine: Negative for polyuria.   Genitourinary: Negative for dysuria.   Neurological: Negative for dizziness and weakness.   Psychiatric/Behavioral: Negative for confusion.       Objective:      Vitals:    04/09/18 1512   BP: (!) 137/91   Pulse: 78     Physical Exam   Constitutional: She is oriented to person, place, and time. She appears well-developed and well-nourished.   HENT:   Head: Normocephalic and atraumatic.   Eyes: Conjunctivae and EOM are normal.   Neck: Normal range of motion. Neck supple.   Cardiovascular: Normal  rate, regular rhythm, normal heart sounds and intact distal pulses.    Pulmonary/Chest: Effort normal and breath sounds normal.   Abdominal: Soft. Bowel sounds are normal.   Musculoskeletal: She exhibits tenderness.   Neurological: She is alert and oriented to person, place, and time.   Skin: Skin is warm and dry.   Psychiatric: She has a normal mood and affect. Her behavior is normal. Judgment and thought content normal.       Assessment:       HPI Ms. Mendoza is a 52 year old female with past medical history of HTN, HLD, PHONG, s/p laparascopic sleeve gastrectomy with ongoing left hip pain presenting for left trochanteric bursa injection  Plan:       Greater trochanteric bursitis of left hip  -     triamcinolone acetonide injection 40 mg; Inject 1 mL (40 mg total) into the muscle one time.    Performed under sterile procedure using 23gauge 3.5 needle with 4cc bupivicaine, 5cc lidocaine and 1cc kenalog. Patient was given stretches to perform at home.     RTC in 2-3 weeks

## 2018-05-02 ENCOUNTER — OFFICE VISIT (OUTPATIENT)
Dept: FAMILY MEDICINE | Facility: HOSPITAL | Age: 53
End: 2018-05-02
Attending: FAMILY MEDICINE
Payer: COMMERCIAL

## 2018-05-02 VITALS
DIASTOLIC BLOOD PRESSURE: 82 MMHG | SYSTOLIC BLOOD PRESSURE: 129 MMHG | BODY MASS INDEX: 40.78 KG/M2 | WEIGHT: 207.69 LBS | HEART RATE: 55 BPM | HEIGHT: 60 IN

## 2018-05-02 DIAGNOSIS — R11.2 INTRACTABLE VOMITING WITH NAUSEA, UNSPECIFIED VOMITING TYPE: Primary | ICD-10-CM

## 2018-05-02 PROCEDURE — 99213 OFFICE O/P EST LOW 20 MIN: CPT | Performed by: FAMILY MEDICINE

## 2018-05-02 RX ORDER — ONDANSETRON 4 MG/1
4 TABLET, FILM COATED ORAL EVERY 6 HOURS PRN
Qty: 40 TABLET | Refills: 0 | Status: SHIPPED | OUTPATIENT
Start: 2018-05-02 | End: 2018-05-12

## 2018-05-02 NOTE — PROGRESS NOTES
Subjective:       Patient ID: Lelia Mendoza is a 52 y.o. female.    Chief Complaint: Nausea and Diarrhea    HPI: Mrs. Mendoza is a 51 yo female with PMH of hypertension, hyperlipidemia, and obesity presenting with 2 days of nausea/vomiting and diarrhea. She endorses having stomach cramps episodically that started 2 weeks ago. This continued until 5/1/18 at 6 am when she had an episode of vomiting followed by profuse diarrhea that lasted all day. She reports having some chills. Her diarrhea is watery and does not have any blood or mucous in it. Her vomit was non bloody/non bilious. 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       Review of Systems   Constitutional: Positive for appetite change, chills and fatigue. Negative for fever.   HENT: Negative for sore throat.    Eyes: Negative for visual disturbance.   Respiratory: Negative for shortness of breath.    Cardiovascular: Negative for chest pain.   Gastrointestinal: Positive for abdominal pain, diarrhea, nausea and vomiting. Negative for blood in stool and constipation.   Endocrine: Negative for polyuria.   Genitourinary: Negative for dysuria.   Musculoskeletal: Negative for back pain.   Skin: Negative for color change.   Neurological: Negative for dizziness, weakness, numbness and headaches.   Psychiatric/Behavioral: Negative for agitation and confusion.       Objective:      Vitals:    05/02/18 1447   BP: 129/82   Pulse: (!) 55     Physical Exam   Constitutional: She is oriented to person, place, and time. She appears well-developed and well-nourished.   HENT:   Head: Normocephalic and atraumatic.   Eyes: EOM are normal. Pupils are equal, round, and reactive to light.   Neck: Normal range of motion. Neck supple.   Cardiovascular: Normal rate, regular rhythm, normal heart sounds and intact distal pulses.    Pulmonary/Chest: Effort normal and breath sounds normal.  No respiratory distress.   Abdominal: Soft. She exhibits no distension and no mass. There is tenderness. There is no rebound and no guarding.   Musculoskeletal: Normal range of motion.   Neurological: She is alert and oriented to person, place, and time.   Skin: Skin is warm and dry.   Psychiatric: She has a normal mood and affect. Her behavior is normal. Judgment and thought content normal.   Nursing note and vitals reviewed.      Assessment:       Mrs. Mendoza is a 51 yo female with PMH of hypertension, hyperlipdemia, and obesity who presented with 2 days of nausea/vomiting, decreased PO intake, and diarrhea likely 2/2 to a viral gastroenteritis.    Plan:       Nausea/Vomiting:  · Zofran PRN for nausea control.    Gastroenteritis/diarrhea:  · Counseled patient on nature of the viral gastroenteritis and how time is needed to clear the infection and how anti-diarrheal's could slow or worsen this process if it happens to be from a bacterial cause.   · Counseled on increasing her PO intake specifically with her fluid to keep an adequate hydration status. Gradually increase her intake starting with clears and finally solids.   · If patients status worsens she was told to return to clinic.     There are no diagnoses linked to this encounter.  No Follow-up on file.

## 2018-05-02 NOTE — LETTER
May 2, 2018    Lelia Mendoza  [address]  [place] [address]             [facility] [address], [address]  [facility] [address]  Phone: [phone]  Fax: [phone]   To whom it may concern, Ms Lelia Mendoza was under my care today for an illness and may return to work on Friday May 4th, 2018.          Sincerely,        Andres Tam MD

## 2018-05-03 NOTE — PROGRESS NOTES
I assume primary medical responsibility for this patient, I have reviewed the case history, findings, diagnosis and treatment plan with the resident and agree that the care is reasonable and necessary. This service has been performed by a resident without the presence of a teaching physician under the primary care exception  Leatha Thomas  5/3/2018

## 2018-07-06 ENCOUNTER — TELEPHONE (OUTPATIENT)
Dept: BARIATRICS | Facility: CLINIC | Age: 53
End: 2018-07-06

## 2018-07-06 DIAGNOSIS — Z98.84 STATUS POST LAPAROSCOPIC SLEEVE GASTRECTOMY: Primary | ICD-10-CM

## 2018-07-06 DIAGNOSIS — I10 HYPERTENSION, UNSPECIFIED TYPE: ICD-10-CM

## 2018-07-06 NOTE — TELEPHONE ENCOUNTER
----- Message from Liliana Flowers sent at 7/6/2018 11:02 AM CDT -----      ----- Message -----  From: Piero Arboleda  Sent: 7/6/2018   8:30 AM  To: Liliana Flowers    Pt stated she needs to get an appt for a follow up visit.    Please call [phone]

## 2018-07-06 NOTE — TELEPHONE ENCOUNTER
Called patient to schedule f/u appointment and labs. Patient understands date, time and location of appointment.

## 2018-07-12 NOTE — PROGRESS NOTES
BARIATRIC FOLLOW UP:    HISTORY OF PRESENT ILLNESS: Lelia Mendoza is a 53 y.o. female, with a Body mass index is 41.46 kg/m². presents for a follow up s/p Sleeve Gastrectomy with Dr. Leigh on 2/26/2015.  Ten pound wt gain since her last visit.  She c/o being hungry at night. Unable to exercise 2/2 LE weakness followed by PCP. She also reports right upper abdominal burning that is constant and has been for years.    Denies: nausea, vomiting, abdominal pain, changes in bowel movement pattern, fever, chills, dysphagia, chest pain, and shortness of breath. Denies reflux, heartburn. Had chest fullness with chicken last week; sensation happens once in a while. No change in the frequency.    Review of Systems   Constitutional: Negative for chills, fever and malaise/fatigue.   Eyes: Negative for blurred vision and double vision.   Respiratory: Negative for cough and shortness of breath.    Cardiovascular: Negative for chest pain, palpitations and leg swelling.   Gastrointestinal: Positive for abdominal pain. Negative for blood in stool, constipation, diarrhea, heartburn, nausea and vomiting.   Musculoskeletal: Negative.    Neurological: Positive for focal weakness (LLE) and headaches (treats with Tylenol). Negative for dizziness and tingling.   Psychiatric/Behavioral: Negative.      EXERCISE & VITAMINS:  See Bariatric Assessment    MEDICATIONS/ALLERGIES:  Have been reviewed.    DIET:  Regular Bariatric Diet.  Diet recall:   830AM Premier shake  11AM Premier shake  230P beans, but only on Monday  6P salad, 1 oz turkey cold cuts   ~70 grams daily protein, more on Mondays.  Has started having a protein shake on the way home in the past 3 weeks.   32 oz water daily.    Vitals:    07/13/18 0814   BP: 127/89   Pulse: 80         Physical Exam   Constitutional: She is oriented to person, place, and time. She appears well-developed and well-nourished.   HENT:   Head: Normocephalic and atraumatic.   Cardiovascular: Normal rate  and regular rhythm.    Pulmonary/Chest: Effort normal and breath sounds normal.   Abdominal: Soft. Bowel sounds are normal. She exhibits no distension and no mass. There is tenderness (RUQ). There is no rebound and no guarding.   WHSS    Musculoskeletal: She exhibits no edema.   Neurological: She is alert and oriented to person, place, and time.   Skin: Skin is warm and dry. No rash noted. No erythema. No pallor.   Psychiatric: She has a normal mood and affect.   Nursing note and vitals reviewed.    ASSESSMENT:  - RUQ pain with tenderness on exam.  - Obesity, Body mass index is 41.46 kg/m². s/p sleeve gastrectomy on 2/26/2015.  - Co-morbidities: HTN (lower on medication), HLD (stable), GERD (stable), PHONG (resolved)  - 10 pound wt gain since her last visit 2/2017. Now totals are 44 lbs, 34% EWL  - No Exercise regimen  - OK Vitamin Regimen  - Fair Diet, low daily protein.    PLAN:  - HIDA with CCK to r/o biliary etiology of pain.  - Bariatric diet counseling provided. Small protein rich meals throughout the day. Sample meal plan.   - Discussed with patient the importance of obtaining 80 gm protein on a daily basis.  Offered suggestions on how to achieve this.   - Slow down meals, make each bite smaller than the tip of your finger, chew that bite 20-30 times, then swallow.  Wait at least 1 minute or more before the next bite.    - Do not mix food and water. 30 minute window.  - Start PPI daily. Revisit RTC.   - Food logs to track diet choices, symptoms.   - Follow-up with dietician to reinforce diet.  - Continue daily vitamins. Increase to bariatric recommendations.   - Encouraged patient to start regular exercise, form as tolerated, as approved by PCP.  - RTC in 1-2 months.  - Call the office for any issues.  - Check labs today    25 minute visit, over 50% of time spent counseling patient face to face on diet, exercise, and weight loss.

## 2018-07-13 ENCOUNTER — OFFICE VISIT (OUTPATIENT)
Dept: BARIATRICS | Facility: CLINIC | Age: 53
End: 2018-07-13
Payer: COMMERCIAL

## 2018-07-13 ENCOUNTER — LAB VISIT (OUTPATIENT)
Dept: LAB | Facility: HOSPITAL | Age: 53
End: 2018-07-13
Payer: COMMERCIAL

## 2018-07-13 VITALS
HEIGHT: 60 IN | SYSTOLIC BLOOD PRESSURE: 127 MMHG | WEIGHT: 212.31 LBS | BODY MASS INDEX: 41.68 KG/M2 | DIASTOLIC BLOOD PRESSURE: 89 MMHG | HEART RATE: 80 BPM

## 2018-07-13 DIAGNOSIS — I10 HYPERTENSION, UNSPECIFIED TYPE: ICD-10-CM

## 2018-07-13 DIAGNOSIS — R63.5 WEIGHT GAIN: ICD-10-CM

## 2018-07-13 DIAGNOSIS — Z98.84 STATUS POST LAPAROSCOPIC SLEEVE GASTRECTOMY: ICD-10-CM

## 2018-07-13 DIAGNOSIS — Z98.84 S/P LAPAROSCOPIC SLEEVE GASTRECTOMY: Primary | ICD-10-CM

## 2018-07-13 DIAGNOSIS — E66.01 MORBID OBESITY WITH BMI OF 40.0-44.9, ADULT: ICD-10-CM

## 2018-07-13 DIAGNOSIS — R10.11 RUQ PAIN: ICD-10-CM

## 2018-07-13 LAB
25(OH)D3+25(OH)D2 SERPL-MCNC: 32 NG/ML
ALBUMIN SERPL BCP-MCNC: 4.2 G/DL
ALP SERPL-CCNC: 100 U/L
ALT SERPL W/O P-5'-P-CCNC: 19 U/L
ANION GAP SERPL CALC-SCNC: 9 MMOL/L
AST SERPL-CCNC: 16 U/L
BASOPHILS # BLD AUTO: 0.03 K/UL
BASOPHILS NFR BLD: 0.6 %
BILIRUB SERPL-MCNC: 0.7 MG/DL
BUN SERPL-MCNC: 21 MG/DL
CALCIUM SERPL-MCNC: 10.1 MG/DL
CHLORIDE SERPL-SCNC: 104 MMOL/L
CHOLEST SERPL-MCNC: 258 MG/DL
CHOLEST/HDLC SERPL: 3.1 {RATIO}
CO2 SERPL-SCNC: 29 MMOL/L
CREAT SERPL-MCNC: 0.9 MG/DL
DIFFERENTIAL METHOD: NORMAL
EOSINOPHIL # BLD AUTO: 0.1 K/UL
EOSINOPHIL NFR BLD: 1.9 %
ERYTHROCYTE [DISTWIDTH] IN BLOOD BY AUTOMATED COUNT: 13.9 %
EST. GFR  (AFRICAN AMERICAN): >60 ML/MIN/1.73 M^2
EST. GFR  (NON AFRICAN AMERICAN): >60 ML/MIN/1.73 M^2
GLUCOSE SERPL-MCNC: 93 MG/DL
HCT VFR BLD AUTO: 43.6 %
HDLC SERPL-MCNC: 84 MG/DL
HDLC SERPL: 32.6 %
HGB BLD-MCNC: 14 G/DL
IMM GRANULOCYTES # BLD AUTO: 0 K/UL
IMM GRANULOCYTES NFR BLD AUTO: 0 %
IRON SERPL-MCNC: 103 UG/DL
LDLC SERPL CALC-MCNC: 159.4 MG/DL
LYMPHOCYTES # BLD AUTO: 2.3 K/UL
LYMPHOCYTES NFR BLD: 44.4 %
MCH RBC QN AUTO: 29.9 PG
MCHC RBC AUTO-ENTMCNC: 32.1 G/DL
MCV RBC AUTO: 93 FL
MONOCYTES # BLD AUTO: 0.3 K/UL
MONOCYTES NFR BLD: 5.6 %
NEUTROPHILS # BLD AUTO: 2.5 K/UL
NEUTROPHILS NFR BLD: 47.5 %
NONHDLC SERPL-MCNC: 174 MG/DL
NRBC BLD-RTO: 0 /100 WBC
PLATELET # BLD AUTO: 271 K/UL
PMV BLD AUTO: 9.9 FL
POTASSIUM SERPL-SCNC: 3.7 MMOL/L
PROT SERPL-MCNC: 7.6 G/DL
RBC # BLD AUTO: 4.69 M/UL
SATURATED IRON: 28 %
SODIUM SERPL-SCNC: 142 MMOL/L
TOTAL IRON BINDING CAPACITY: 373 UG/DL
TRANSFERRIN SERPL-MCNC: 252 MG/DL
TRIGL SERPL-MCNC: 73 MG/DL
VIT B12 SERPL-MCNC: 674 PG/ML
WBC # BLD AUTO: 5.16 K/UL

## 2018-07-13 PROCEDURE — 83540 ASSAY OF IRON: CPT

## 2018-07-13 PROCEDURE — 3074F SYST BP LT 130 MM HG: CPT | Mod: CPTII,S$GLB,, | Performed by: NURSE PRACTITIONER

## 2018-07-13 PROCEDURE — 99999 PR PBB SHADOW E&M-EST. PATIENT-LVL IV: CPT | Mod: PBBFAC,,, | Performed by: NURSE PRACTITIONER

## 2018-07-13 PROCEDURE — 80061 LIPID PANEL: CPT

## 2018-07-13 PROCEDURE — 3079F DIAST BP 80-89 MM HG: CPT | Mod: CPTII,S$GLB,, | Performed by: NURSE PRACTITIONER

## 2018-07-13 PROCEDURE — 82306 VITAMIN D 25 HYDROXY: CPT

## 2018-07-13 PROCEDURE — 36415 COLL VENOUS BLD VENIPUNCTURE: CPT

## 2018-07-13 PROCEDURE — 84425 ASSAY OF VITAMIN B-1: CPT

## 2018-07-13 PROCEDURE — 99213 OFFICE O/P EST LOW 20 MIN: CPT | Mod: S$GLB,,, | Performed by: NURSE PRACTITIONER

## 2018-07-13 PROCEDURE — 3008F BODY MASS INDEX DOCD: CPT | Mod: CPTII,S$GLB,, | Performed by: NURSE PRACTITIONER

## 2018-07-13 PROCEDURE — 80053 COMPREHEN METABOLIC PANEL: CPT

## 2018-07-13 PROCEDURE — 82607 VITAMIN B-12: CPT

## 2018-07-13 PROCEDURE — 85025 COMPLETE CBC W/AUTO DIFF WBC: CPT

## 2018-07-13 RX ORDER — OMEPRAZOLE 40 MG/1
40 CAPSULE, DELAYED RELEASE ORAL EVERY MORNING
Qty: 30 CAPSULE | Refills: 2 | Status: SHIPPED | OUTPATIENT
Start: 2018-07-13 | End: 2019-11-12 | Stop reason: CLARIF

## 2018-07-13 NOTE — PATIENT INSTRUCTIONS
Menu Plan: 800-1000 Calories;  grams of Protein    DAY 1     Breakfast  ½ cup 2% cottage cheese  ¼ cup fruit (no sugar added)    Snack  2% mozzarella string cheese  10 grapes    Lunch  2oz Lean hamburger or turkey faustino  1 slice low-fat cheese  ¼ cup green beans    Snack  200 calorie low-carb protein drink (4 grams sugar or less)    Dinner  2oz chicken thigh  ¼ cup cooked spinach     Snack  Atkins bar (15g protein)      DAY 2    Breakfast  1 egg with 1oz shredded cheddar cheese and 2T salsa    Snack  200 calorie low-carb protein drink (4 grams sugar or less)    Lunch  Lettuce Wraps: 2oz sliced turkey, 1 slice low-fat Swiss cheese, tomato, and mustard wrapped in a Kun lettuce leaf    Snack  ½ cup low-fat cottage cheese  Pear cup (no sugar added)    Dinner  2oz baked fish  ½ cup cooked broccoli    Snack  Sugar-free pudding cup      DAY 3    Breakfast   ½ cup low-fat ricotta cheese w/ Splenda to howard  ½ scoop Vanilla protein powder   ¼ cup fresh fruit    Snack  2% string cheese  6 unsalted almonds    Lunch  Tuna/Chicken Salad: 2oz canned tuna/chicken, 1 egg white, and 1 tsp light swanson  Pineapple cup (no sugar added)    Snack  200 calorie low-carb protein drink (4 grams sugar or less)    Dinner  ½ baked pork chop   ¼ cup beans      DAY 4    Breakfast  200 calorie low-carb protein drink (4 grams sugar or less)    Snack  Boiled egg    Lunch  ½ cup grilled shrimp  Salad w/ 2 tbsp crumbled fat-free feta  1 tbsp light vinaigrette    Snack  200 calorie low-carb protein drink (4 grams sugar or less)    Dinner  ¾ cup red beans    Snack  Mini Babybell light      DAY 5    Breakfast  Key Lime pie: 3oz Greek yogurt, 1 tbsp Splenda, ½ individual pack Crystal Light lemonade. Top with ¼ cup chopped walnuts     Snack  3-4 lean ham or turkey slices, ¼ - ½ cup fruit    Lunch  Fiesta Chicken: 2oz canned chicken, 1oz shredded cheddar cheese, ¼ cup black beans  Top with 2 tbsp salsa and a small dollop light sour  cream    Snack  200 calorie low-carb protein drink (4 grams sugar or less)    Dinner  Omelette: ¼ cup Egg Beaters, 4 large (1oz) shrimp, 1oz shredded low-fat cheese. Add bell pepper, onion, mushrooms, green onions, or salsa, optional.      DAY 6    Breakfast  1 marleni or 2 links turkey sausage  ½ cup fruit    Snack  200 calorie low-carb protein drink (4 grams sugar or less)    Lunch  Grilled tilapia  Salad of baby spinach leaves with light dressing    Snack  200 calorie low-carb protein drink (4 grams sugar or less)    Dinner  Chicken thigh simmered in 98% fat free cream of mushroom soup  ½ cup cooked green beans

## 2018-07-17 LAB — VIT B1 SERPL-MCNC: 58 UG/L (ref 38–122)

## 2019-04-22 ENCOUNTER — TELEPHONE (OUTPATIENT)
Dept: BARIATRICS | Facility: CLINIC | Age: 54
End: 2019-04-22

## 2019-04-22 NOTE — TELEPHONE ENCOUNTER
----- Message from Jose Jalloh sent at 4/22/2019  8:44 AM CDT -----  Contact: Pt  Needs Advice    Reason for call:The Pt is interested in getting an appt with a dietician for weight loss.        Communication Preference Work [phone]  Home [phone]    Additional Information:

## 2019-04-22 NOTE — TELEPHONE ENCOUNTER
Returned patient's call. Carolyn 2016, wants appt with RD. Left message to return call or send msg through my chart.

## 2019-04-26 ENCOUNTER — TELEPHONE (OUTPATIENT)
Dept: BARIATRICS | Facility: CLINIC | Age: 54
End: 2019-04-26

## 2019-04-26 ENCOUNTER — OFFICE VISIT (OUTPATIENT)
Dept: BARIATRICS | Facility: CLINIC | Age: 54
End: 2019-04-26
Payer: COMMERCIAL

## 2019-04-26 ENCOUNTER — LAB VISIT (OUTPATIENT)
Dept: LAB | Facility: HOSPITAL | Age: 54
End: 2019-04-26
Payer: COMMERCIAL

## 2019-04-26 ENCOUNTER — CLINICAL SUPPORT (OUTPATIENT)
Dept: BARIATRICS | Facility: CLINIC | Age: 54
End: 2019-04-26
Payer: COMMERCIAL

## 2019-04-26 VITALS
DIASTOLIC BLOOD PRESSURE: 92 MMHG | HEIGHT: 60 IN | WEIGHT: 227.5 LBS | BODY MASS INDEX: 44.66 KG/M2 | SYSTOLIC BLOOD PRESSURE: 140 MMHG | HEART RATE: 60 BPM

## 2019-04-26 VITALS — WEIGHT: 227.5 LBS | BODY MASS INDEX: 44.43 KG/M2

## 2019-04-26 DIAGNOSIS — R63.5 WEIGHT GAIN: ICD-10-CM

## 2019-04-26 DIAGNOSIS — Z71.9 HEALTH COUNSELING: ICD-10-CM

## 2019-04-26 DIAGNOSIS — Z98.84 S/P LAPAROSCOPIC SLEEVE GASTRECTOMY: Primary | ICD-10-CM

## 2019-04-26 DIAGNOSIS — Z98.84 S/P LAPAROSCOPIC SLEEVE GASTRECTOMY: ICD-10-CM

## 2019-04-26 PROCEDURE — 99999 PR PBB SHADOW E&M-EST. PATIENT-LVL III: ICD-10-PCS | Mod: PBBFAC,,, | Performed by: NURSE PRACTITIONER

## 2019-04-26 PROCEDURE — 3077F PR MOST RECENT SYSTOLIC BLOOD PRESSURE >= 140 MM HG: ICD-10-PCS | Mod: CPTII,S$GLB,, | Performed by: NURSE PRACTITIONER

## 2019-04-26 PROCEDURE — 3077F SYST BP >= 140 MM HG: CPT | Mod: CPTII,S$GLB,, | Performed by: NURSE PRACTITIONER

## 2019-04-26 PROCEDURE — 3008F BODY MASS INDEX DOCD: CPT | Mod: CPTII,S$GLB,, | Performed by: NURSE PRACTITIONER

## 2019-04-26 PROCEDURE — 99213 PR OFFICE/OUTPT VISIT, EST, LEVL III, 20-29 MIN: ICD-10-PCS | Mod: S$GLB,,, | Performed by: NURSE PRACTITIONER

## 2019-04-26 PROCEDURE — 99999 PR PBB SHADOW E&M-EST. PATIENT-LVL I: ICD-10-PCS | Mod: PBBFAC,,, | Performed by: DIETITIAN, REGISTERED

## 2019-04-26 PROCEDURE — 99499 NO LOS: ICD-10-PCS | Mod: S$GLB,,, | Performed by: DIETITIAN, REGISTERED

## 2019-04-26 PROCEDURE — 3080F PR MOST RECENT DIASTOLIC BLOOD PRESSURE >= 90 MM HG: ICD-10-PCS | Mod: CPTII,S$GLB,, | Performed by: NURSE PRACTITIONER

## 2019-04-26 PROCEDURE — 3080F DIAST BP >= 90 MM HG: CPT | Mod: CPTII,S$GLB,, | Performed by: NURSE PRACTITIONER

## 2019-04-26 PROCEDURE — 99999 PR PBB SHADOW E&M-EST. PATIENT-LVL III: CPT | Mod: PBBFAC,,, | Performed by: NURSE PRACTITIONER

## 2019-04-26 PROCEDURE — 3008F PR BODY MASS INDEX (BMI) DOCUMENTED: ICD-10-PCS | Mod: CPTII,S$GLB,, | Performed by: NURSE PRACTITIONER

## 2019-04-26 PROCEDURE — 36415 COLL VENOUS BLD VENIPUNCTURE: CPT

## 2019-04-26 PROCEDURE — 99499 UNLISTED E&M SERVICE: CPT | Mod: S$GLB,,, | Performed by: DIETITIAN, REGISTERED

## 2019-04-26 PROCEDURE — 84425 ASSAY OF VITAMIN B-1: CPT

## 2019-04-26 PROCEDURE — 99213 OFFICE O/P EST LOW 20 MIN: CPT | Mod: S$GLB,,, | Performed by: NURSE PRACTITIONER

## 2019-04-26 PROCEDURE — 99999 PR PBB SHADOW E&M-EST. PATIENT-LVL I: CPT | Mod: PBBFAC,,, | Performed by: DIETITIAN, REGISTERED

## 2019-04-26 RX ORDER — PHENTERMINE HYDROCHLORIDE 37.5 MG/1
37.5 TABLET ORAL DAILY
Refills: 0 | COMMUNITY
Start: 2019-01-21 | End: 2019-04-26

## 2019-04-26 NOTE — PROGRESS NOTES
NUTRITION NOTE    Referring Physician: Sami Leigh M.D.  Reason for MNT Referral: Established pt follow-up with wt regain s/p Gastric Sleeve    Patient presents for f/u visit with 15 lbs weight gain over the past year. Lowest weight was 207 lbs a year after surgery. She states that her diet has been consistent for the past year, small portions, several protein centered meals daily. Still drinks 1-2 Premier shakes daily. Cannot exercise as much as she wishes. She needs hip surgery, but surgeon says he won't operate until she loses 100 more pounds. This seems like a misunderstanding.    CLINICAL DATA:  53 y.o. female.    Current Weight: 227 lbs  Weight Change Since Initial Visit: - 28 lbs  Ideal Body Weight: 126 lbs  Body mass index is 44.43 kg/m².   EWL: 22%    NUTRITIONAL NEEDS:  800-1200 Calories (bariatric diet)   Grams Protein    CURRENT DIET:  Reduced-calorie diet. Low carb  Diet Recall: Food records are present. 3 days.    - Premier shake  - 2 boiled eggs  - garden salad with deli turkey  - 1 cup red beans (no rice)  - 3oz baked catfish, 1/2 cup steamed mix veg  - string cheese  - Premier shake  - small bag chips  - 2 glasses wine    Diet Includes:   Meal Pattern: Same.  Protein Supplements: 1-2 per day.  Snacking: Adequate. Snacks include healthy choices, junk foods and sweets. Occ bag of chips or cookie.  Vegetables: Likes a variety. Eats daily.  Fruits: Likes a variety. Eats daily.  Beverages: water and sugar-free beverages.   Dining Out:  Rarely Mostly take-out. Thin crust pizza.  Cooking at home: Daily. Mostly baked, grilled and smothered meat, fish and vegetables.    CURRENT EXERCISE:  None. Has an elliptical at home that does not hurt her hip.    Restrictions to exercise: bad hip    Vitamins / Minerals / Herbs:   Off track    Social:  Works regular daytime shifts. 6am-9pm.  Alcohol: Socially. Wine 2 glasses, about 4-5 times/week.  Smoking: None.    ASSESSMENT:  Patient demonstrates  willingness to change lifestyle habits as evidenced by daily food logs, dietary changes, including protein drinks, increased fruits, increased vegetables, reduced dining out and healthier cooking at home.    Doing fairly well with working on greatest challenges (starchy CHO, snacking at night and emotional eating).    Barriers to Education:  none  Stage of Change:  determination    NUTRITION DIAGNOSIS:  Obesity related to Excessive carbohydrate intake as evidence by BMI.  Status: Improved    PLAN:    Diet: Adjust diet plan.  Continue to review Bariatric Nutrition Guidebook at home and call with any questions.  Work on Bariatric Nutrition Checklist.     Reboot Diet: 5 days liquid, starting Monday  Eliminate wine, chips, and whatever else snuck back in the diet that shouldn't be there    Exercise: Begin. Start with 5 min on Elliptical. Work up to 30 min if possible. Also recommend seated chair exercises using light weights or stretchy bands.    Behavior Modification: Continue to document food & activity logs daily. Phone Cira.    Return to clinic in one month.    Communicated nutrition plan with bariatric team.    SESSION TIME:  30 minutes

## 2019-04-26 NOTE — TELEPHONE ENCOUNTER
Called patient to schedule f/u appt and consul with Dr. Terry. Left message with call back number.

## 2019-04-26 NOTE — PROGRESS NOTES
BARIATRIC FOLLOW UP:    HISTORY OF PRESENT ILLNESS: Lelia Mendoza is a 53 y.o. female, with a Body mass index is 44.43 kg/m². presents for a follow up s/p Sleeve Gastrectomy with Dr. Leigh on 2/26/2015.  Here today for wt gain. Concerned because she reports she does not eat a lot. Thinks it may be the gabapentin.   Reports 90 grams of protein daily. Reports limited CHO. May have 1 Oreo or Lise Snap some evenings. Eats corn chips twice a week. Avoids rice, bread, pasta.  Reports struggles with cravings, not appetite.  Has RD appointment following for back on track counseling.  Off track with vitamins.   Unable to exercise 2/2 L hip pain, followed by PCP.   Could not afford PT.  Has been told she needs to lose 100 pounds for replacement.     Did not have HIDA because pain resolved.    Review of Systems   Constitutional: Negative for chills, fever and malaise/fatigue.   Eyes: Negative for blurred vision (only when waking up, resolves once she gets up, x 1 year) and double vision.   Respiratory: Negative for cough and shortness of breath.    Cardiovascular: Negative for chest pain and palpitations.   Gastrointestinal: Negative for abdominal pain, blood in stool, constipation, diarrhea, heartburn, nausea and vomiting.   Musculoskeletal: Positive for joint pain.   Neurological: Negative for dizziness, tingling (Left hip), focal weakness and headaches.   Psychiatric/Behavioral: Negative.  Negative for depression. The patient is not nervous/anxious.      DIET:  HPI    Vitals:    04/26/19 0935   BP: (!) 140/92   Pulse: 60     Physical Exam   Constitutional: She is oriented to person, place, and time. She appears well-developed and well-nourished.   HENT:   Head: Normocephalic and atraumatic.   Cardiovascular: Normal rate and regular rhythm.   Pulmonary/Chest: Effort normal. No respiratory distress.   Abdominal: Soft. Bowel sounds are normal. She exhibits no distension and no mass. There is tenderness (epigastric,  mild). There is no guarding.   Musculoskeletal: She exhibits no edema.   Neurological: She is alert and oriented to person, place, and time.   Skin: Skin is warm and dry. No rash noted. No erythema. No pallor.   Psychiatric: She has a normal mood and affect. Her behavior is normal. Judgment and thought content normal.   Nursing note and vitals reviewed.    ASSESSMENT:  - Mild epigastric tenderness  - Obesity, Body mass index is 44.43 kg/m². s/p sleeve gastrectomy on 2/26/2015.  - Co-morbidities: HTN (lower on medication), HLD (stable), GERD (stable), PHONG (resolved)  - Wt gain  - No Exercise regimen  - Poor Vitamin Regimen  - Fair Diet    PLAN:  - Zantac trial  - Monitor for RUQ pain. Notify if persistent.  - Diet counseling. Replace corn chips with protein snack.  - [facility]erral to [facility]  - Discussed pool exercise as tolerated for physical activity  - Full bariatric vitamin regimen. Reviewed.   The patient verbalized understanding of the risks of thiamine deficiency, including and not limited to permanent neurologic deficits/damage and blindness. Agrees to take vitamins as directed.  - RTC in 3 months.  - Call the office for any issues.  - B1 today, additional yearly labs 7/2019.    25 minute visit, over 50% of time spent counseling patient face to face on diet, exercise, and weight loss.

## 2019-05-01 LAB — VIT B1 BLD-MCNC: 50 UG/L (ref 38–122)

## 2019-05-07 ENCOUNTER — HOSPITAL ENCOUNTER (EMERGENCY)
Facility: HOSPITAL | Age: 54
Discharge: HOME | End: 2019-05-08
Attending: EMERGENCY MEDICINE
Payer: COMMERCIAL

## 2019-05-07 VITALS
WEIGHT: 227 LBS | DIASTOLIC BLOOD PRESSURE: 88 MMHG | BODY MASS INDEX: 44.57 KG/M2 | HEART RATE: 77 BPM | RESPIRATION RATE: 16 BRPM | OXYGEN SATURATION: 99 % | HEIGHT: 60 IN | TEMPERATURE: 99 F | SYSTOLIC BLOOD PRESSURE: 172 MMHG

## 2019-05-07 DIAGNOSIS — R10.9 LEFT FLANK PAIN: ICD-10-CM

## 2019-05-07 DIAGNOSIS — R11.0 NAUSEA: ICD-10-CM

## 2019-05-07 DIAGNOSIS — N20.0 NEPHROLITHIASIS: Primary | ICD-10-CM

## 2019-05-07 LAB
ALBUMIN SERPL BCP-MCNC: 4.1 G/DL (ref 3.5–5.2)
ALP SERPL-CCNC: 105 U/L (ref 55–135)
ALT SERPL W/O P-5'-P-CCNC: 18 U/L (ref 10–44)
ANION GAP SERPL CALC-SCNC: 8 MMOL/L (ref 8–16)
AST SERPL-CCNC: 16 U/L (ref 10–40)
BASOPHILS # BLD AUTO: 0.02 K/UL (ref 0–0.2)
BASOPHILS NFR BLD: 0.2 % (ref 0–1.9)
BILIRUB SERPL-MCNC: 0.5 MG/DL (ref 0.1–1)
BILIRUB UR QL STRIP: NEGATIVE
BUN SERPL-MCNC: 24 MG/DL (ref 6–20)
CALCIUM SERPL-MCNC: 9.9 MG/DL (ref 8.7–10.5)
CHLORIDE SERPL-SCNC: 106 MMOL/L (ref 95–110)
CLARITY UR REFRACT.AUTO: CLEAR
CO2 SERPL-SCNC: 27 MMOL/L (ref 23–29)
COLOR UR AUTO: NORMAL
CREAT SERPL-MCNC: 1 MG/DL (ref 0.5–1.4)
DIFFERENTIAL METHOD: NORMAL
EOSINOPHIL # BLD AUTO: 0.1 K/UL (ref 0–0.5)
EOSINOPHIL NFR BLD: 0.8 % (ref 0–8)
ERYTHROCYTE [DISTWIDTH] IN BLOOD BY AUTOMATED COUNT: 14.1 % (ref 11.5–14.5)
EST. GFR  (AFRICAN AMERICAN): >60 ML/MIN/1.73 M^2
EST. GFR  (NON AFRICAN AMERICAN): >60 ML/MIN/1.73 M^2
GLUCOSE SERPL-MCNC: 100 MG/DL (ref 70–110)
GLUCOSE UR QL STRIP: NEGATIVE
HCT VFR BLD AUTO: 41.6 % (ref 37–48.5)
HGB BLD-MCNC: 13.5 G/DL (ref 12–16)
HGB UR QL STRIP: NEGATIVE
IMM GRANULOCYTES # BLD AUTO: 0.03 K/UL (ref 0–0.04)
IMM GRANULOCYTES NFR BLD AUTO: 0.3 % (ref 0–0.5)
KETONES UR QL STRIP: NEGATIVE
LEUKOCYTE ESTERASE UR QL STRIP: NEGATIVE
LIPASE SERPL-CCNC: 9 U/L (ref 4–60)
LYMPHOCYTES # BLD AUTO: 1.8 K/UL (ref 1–4.8)
LYMPHOCYTES NFR BLD: 19.2 % (ref 18–48)
MCH RBC QN AUTO: 29.6 PG (ref 27–31)
MCHC RBC AUTO-ENTMCNC: 32.5 G/DL (ref 32–36)
MCV RBC AUTO: 91 FL (ref 82–98)
MONOCYTES # BLD AUTO: 0.6 K/UL (ref 0.3–1)
MONOCYTES NFR BLD: 6.7 % (ref 4–15)
NEUTROPHILS # BLD AUTO: 6.7 K/UL (ref 1.8–7.7)
NEUTROPHILS NFR BLD: 72.8 % (ref 38–73)
NITRITE UR QL STRIP: NEGATIVE
NRBC BLD-RTO: 0 /100 WBC
PH UR STRIP: 7 [PH] (ref 5–8)
PLATELET # BLD AUTO: 237 K/UL (ref 150–350)
PMV BLD AUTO: 10.2 FL (ref 9.2–12.9)
POTASSIUM SERPL-SCNC: 3.8 MMOL/L (ref 3.5–5.1)
PROT SERPL-MCNC: 7.3 G/DL (ref 6–8.4)
PROT UR QL STRIP: NEGATIVE
RBC # BLD AUTO: 4.56 M/UL (ref 4–5.4)
SODIUM SERPL-SCNC: 141 MMOL/L (ref 136–145)
SP GR UR STRIP: 1.01 (ref 1–1.03)
URN SPEC COLLECT METH UR: NORMAL
WBC # BLD AUTO: 9.15 K/UL (ref 3.9–12.7)

## 2019-05-07 PROCEDURE — 63600175 PHARM REV CODE 636 W HCPCS: Performed by: PHYSICIAN ASSISTANT

## 2019-05-07 PROCEDURE — 83690 ASSAY OF LIPASE: CPT

## 2019-05-07 PROCEDURE — 85025 COMPLETE CBC W/AUTO DIFF WBC: CPT

## 2019-05-07 PROCEDURE — 25000003 PHARM REV CODE 250: Performed by: PHYSICIAN ASSISTANT

## 2019-05-07 PROCEDURE — 99285 EMERGENCY DEPT VISIT HI MDM: CPT | Mod: ,,, | Performed by: PHYSICIAN ASSISTANT

## 2019-05-07 PROCEDURE — 80053 COMPREHEN METABOLIC PANEL: CPT

## 2019-05-07 PROCEDURE — 81003 URINALYSIS AUTO W/O SCOPE: CPT

## 2019-05-07 PROCEDURE — 96374 THER/PROPH/DIAG INJ IV PUSH: CPT

## 2019-05-07 PROCEDURE — 99285 PR EMERGENCY DEPT VISIT,LEVEL V: ICD-10-PCS | Mod: ,,, | Performed by: PHYSICIAN ASSISTANT

## 2019-05-07 PROCEDURE — 96375 TX/PRO/DX INJ NEW DRUG ADDON: CPT

## 2019-05-07 PROCEDURE — 99284 EMERGENCY DEPT VISIT MOD MDM: CPT | Mod: 25

## 2019-05-07 RX ORDER — MORPHINE SULFATE 4 MG/ML
4 INJECTION, SOLUTION INTRAMUSCULAR; INTRAVENOUS
Status: COMPLETED | OUTPATIENT
Start: 2019-05-07 | End: 2019-05-07

## 2019-05-07 RX ORDER — HYDROCODONE BITARTRATE AND ACETAMINOPHEN 5; 325 MG/1; MG/1
1 TABLET ORAL EVERY 4 HOURS PRN
Qty: 18 TABLET | Refills: 0 | Status: SHIPPED | OUTPATIENT
Start: 2019-05-07 | End: 2019-11-12 | Stop reason: CLARIF

## 2019-05-07 RX ORDER — TAMSULOSIN HYDROCHLORIDE 0.4 MG/1
0.4 CAPSULE ORAL
Status: COMPLETED | OUTPATIENT
Start: 2019-05-07 | End: 2019-05-07

## 2019-05-07 RX ORDER — ONDANSETRON 4 MG/1
4 TABLET, FILM COATED ORAL EVERY 6 HOURS
Qty: 12 TABLET | Refills: 0 | Status: SHIPPED | OUTPATIENT
Start: 2019-05-07 | End: 2019-11-12 | Stop reason: CLARIF

## 2019-05-07 RX ORDER — TAMSULOSIN HYDROCHLORIDE 0.4 MG/1
0.4 CAPSULE ORAL DAILY
Qty: 30 CAPSULE | Refills: 0 | Status: SHIPPED | OUTPATIENT
Start: 2019-05-07 | End: 2019-11-12 | Stop reason: CLARIF

## 2019-05-07 RX ORDER — ONDANSETRON 2 MG/ML
4 INJECTION INTRAMUSCULAR; INTRAVENOUS
Status: COMPLETED | OUTPATIENT
Start: 2019-05-07 | End: 2019-05-07

## 2019-05-07 RX ORDER — KETOROLAC TROMETHAMINE 30 MG/ML
10 INJECTION, SOLUTION INTRAMUSCULAR; INTRAVENOUS
Status: COMPLETED | OUTPATIENT
Start: 2019-05-07 | End: 2019-05-07

## 2019-05-07 RX ADMIN — TAMSULOSIN HYDROCHLORIDE 0.4 MG: 0.4 CAPSULE ORAL at 11:05

## 2019-05-07 RX ADMIN — MORPHINE SULFATE 4 MG: 4 INJECTION INTRAVENOUS at 09:05

## 2019-05-07 RX ADMIN — ONDANSETRON 4 MG: 2 INJECTION INTRAMUSCULAR; INTRAVENOUS at 09:05

## 2019-05-07 RX ADMIN — KETOROLAC TROMETHAMINE 10 MG: 30 INJECTION, SOLUTION INTRAMUSCULAR at 09:05

## 2019-05-08 NOTE — ED TRIAGE NOTES
Left flank pain radiating to left lower back onset today. Pt reports urinary urgency and incontinence.

## 2019-05-08 NOTE — ED PROVIDER NOTES
Encounter Date: 2019       History     Chief Complaint   Patient presents with    Flank Pain     C/o L flank pain that radiates into L abdomen since 1600. Denies dysuria/hematuria. Endorses nausea.      53-year-old female with PMHx of HTN, migraines, PHONG, HLP, and s/p partial hysterectomy who presents to the ED with c/o L flank pain. Per pt, she suddenly developed severe left flank pain at 4 PM this afternoon, which has been persistent since. She describe her pain as a sharp pain radiating from her left flank to her left sided back, worse with lying supine, better with sitting upright, and rated 10/10 currently. She has had associated nausea, but denies vomiting. She also reports intermittent left thigh numbness, which is not present currently. She has had urinary urgency today with occasionally not making it to the restroom. She denies dysuria, hematuria, vaginal bleeding/discharge, diarrhea, constipation, blood in stool, melena, fevers, chills, chest pain, SOB, or any other medical complaints.     The history is provided by the patient.     Review of patient's allergies indicates:   Allergen Reactions    Lisinopril Swelling     Swelling of lips     Past Medical History:   Diagnosis Date    Hyperlipidemia LDL goal < 130     Hypertension     Migraine     over 10 years but not so bad lately    Obstructive sleep apnea 2015    PHONG on CPAP     2015     Past Surgical History:   Procedure Laterality Date     SECTION      COLONOSCOPY N/A 10/14/2014    Performed [facility] at [facility]    ESOPHAGOGASTRODUODENOSCOPY (EGD) N/A 10/14/2014    [facility]    GASTRECTOMY      GASTRECTOMY-SLEEVE-LAPAROSCOPIC - [address] W/ Intraop Egd N/A 2016    Performed [facility] at [facility] OR 2ND FLR    HYSTERECTOMY      with oopherectomy (1)    OOPHORECTOMY      only one removed     Family History   Problem Relation Age of Onset    Hypertension  Mother     Arthritis Mother     Asthma Mother     Hyperlipidemia Mother     Heart disease Father     Stroke Brother     Heart disease Brother     Diabetes Maternal Grandmother     Diabetes Maternal Grandfather     Diabetes Paternal Grandmother     Diabetes Paternal Grandfather      Social History     Tobacco Use    Smoking status: Never Smoker    Smokeless tobacco: Never Used   Substance Use Topics    Alcohol use: Yes     Comment: social    Drug use: No     Review of Systems   Constitutional: Negative for chills and fever.   HENT: Negative for congestion.    Eyes: Negative for visual disturbance.   Respiratory: Negative for shortness of breath.    Cardiovascular: Negative for chest pain.   Gastrointestinal: Positive for nausea. Negative for abdominal pain, blood in stool, constipation, diarrhea and vomiting.   Genitourinary: Positive for flank pain (left) and urgency. Negative for dysuria and hematuria.   Musculoskeletal: Positive for back pain (left sided). Negative for neck pain.   Skin: Negative for rash.   Neurological: Positive for numbness (intermittent). Negative for dizziness, weakness and headaches.   Hematological: Does not bruise/bleed easily.   Psychiatric/Behavioral: Negative for confusion.       Physical Exam     Initial Vitals [05/07/19 2049]   BP Pulse Resp Temp SpO2   (!) 172/88 77 16 98.6 °F (37 °C) 99 %      MAP       --         Physical Exam    Nursing note and vitals reviewed.  Constitutional: She appears well-developed and well-nourished.   HENT:   Head: Normocephalic and atraumatic.   Eyes: Conjunctivae and EOM are normal.   Neck: Normal range of motion. Neck supple.   Cardiovascular: Normal rate, regular rhythm and normal heart sounds.   Pulmonary/Chest: Breath sounds normal. She has no wheezes. She has no rhonchi. She has no rales.   Abdominal: Soft. There is no tenderness. There is no rebound and no guarding.   Left sided CVA tenderness.    Musculoskeletal: Normal range of  motion. She exhibits no edema or tenderness.   No midline C, T, or L spine tenderness to palpation.    Neurological: She is alert.   Strength and sensation intact and symmetric throughout upper and lower extremities.    Skin: Skin is warm.   Psychiatric: She has a normal mood and affect.         ED Course   Procedures  Labs Reviewed   COMPREHENSIVE METABOLIC PANEL - Abnormal; Notable for the following components:       Result Value    BUN, Bld 24 (*)     All other components within normal limits   CBC W/ AUTO DIFFERENTIAL   LIPASE   URINALYSIS, REFLEX TO URINE CULTURE    Narrative:     yellow and gray  Preferred Collection Type->Urine, Clean Catch          Imaging Results           CT Renal Stone Study ABD Pelvis WO (Final result)  Result time 05/07/19 23:02:08    Final result by Malik Garcia MD (05/07/19 23:02:08)                 Impression:      1. Obstructing stone at the left ureteropelvic junction measuring 1.3 cm with resulting moderate left-sided hydronephrosis and associated moderate left perinephric fat stranding.  2. Colonic diverticulosis without evidence for diverticulitis.  3. Postsurgical changes of sleeve gastrectomy and hysterectomy.  4. Additional findings as above.  This report was flagged in Epic as abnormal.    Electronically signed by resident: Andres Flores  Date:    05/07/2019  Time:    22:17    Electronically signed by: Malik Garcia MD  Date:    05/07/2019  Time:    23:02             Narrative:    EXAMINATION:  CT RENAL STONE STUDY ABD PELVIS WO    CLINICAL HISTORY:  Flank pain, stone disease suspected;    TECHNIQUE:  The patient was surveyed from the lung bases through the pelvis without the administration of contrast.  The data was reconstructed for coronal, sagittal, and axial images.    COMPARISON:  None.    FINDINGS:  The lung bases are clear.  No pleural effusion is seen.    The visualized portions of the heart appear normal. No pericardial effusion.    The liver is normal in  size and attenuation.  No focal hepatic abnormality is seen. The gallbladder is unremarkable.  No intrahepatic or extrahepatic biliary ductal dilatation is identified. The spleen is unremarkable.    Stomach demonstrates postsurgical changes of sleeve gastrectomy.  The pancreas and adrenal glands are unremarkable.    The kidneys are normal in size and location. There is an obstructing stone at the left ureteropelvic junction measuring 1.3 cm with resulting moderate left hydronephrosis.  Moderate left-sided perinephric fat stranding.  No right-sided nephrolithiasis or hydronephrosis is seen.  The right ureter and distal left ureter appear normal in course and caliber. The urinary bladder is unremarkable. Uterus is surgically absent.    The visualized loops of small and large bowel show no evidence of obstruction or inflammation.  Scattered colonic diverticulosis without evidence for diverticulitis.  The appendix is unremarkable.    No ascites, free fluid, or intraperitoneal free air is identified.  There are several normal size mesenteric lymph nodes without evidence for pathologic marlen enlargement within the abdomen or pelvis.  The abdominal aorta is normal in course and caliber without significant atherosclerotic calcifications.    The osseous structures demonstrate degenerative changes with grade 1 anterolisthesis of L5 on S1.  Degenerative changes left hip.  No acute fracture or osseous destructive process.  The extraperitoneal soft tissues a tiny fat containing umbilical hernia.                                 Medical Decision Making:   History:   Old Medical Records: I decided to obtain old medical records.  Old Records Summarized: other records and records from clinic visits.  Initial Assessment:   53-year-old female with PMHx of HTN, migraines, PHONG, HLP, and s/p partial hysterectomy who presents to the ED with c/o L flank pain. Pt reports pain suddenly began around 4 PM this afternoon. Associated nausea,  urinary urgency, and left thigh numbness. Denies dysuria or hematuria. Vital signs are stable. RRR. Lungs CTA bilaterally. Positive left CVA tenderness. Neurovascularly intact throughout.   Differential Diagnosis:   DDx includes but is not limited to nephrolithiasis, pyelonephritis, UTI, pancreatitis, anemia, electrolyte abnormality, diverticulitis, SBO.   Clinical Tests:   Lab Tests: Ordered and Reviewed  Radiological Study: Ordered and Reviewed  ED Management:  There is a high suspicion for nephrolithiasis. Will obtain renal stone study CT, basic labs, lipase, and UA. Patient given 4 mg of IV morphine and Zofran.     No leukocytosis or anemia on CBC. Bun 24, Cr 1.0. Lipase 9. UA negative for leukocytes, nitrites, and occult blood. Renal CT with obstructing stone at the left ureteropelvic junction measuring 1.3 cm with resulting moderate left-sided hydronephrosis and associated moderate left perinephric fat stranding. Also, diverticulosis without evidence of diverticulitis.     Upon reassessment, patient is resting comfortably in recliner. Patient reports resolution of symptoms with morphine. Reviewed results with patient. After discussing options of observation vs outpatient, patient feels comfortable being discharged with close follow up with Urology. Ambulatory referral placed. She was given prescriptions for tamsulosin, Norco, and zofran. Pt given urine strainer with instructions on use. Strict ER return precautions given. All questions answered. Patient expressed understanding and is agreeable with the plan.     I have discussed the treatment and management of this patient with my supervising physician, and we agree on the plan of care.      Mayra Israel PA-C                Attending Attestation:     Physician Attestation Statement for NP/PA:   I discussed this assessment and plan of this patient with the NP/PA, but I did not personally examine the patient. The face to face encounter was performed by the  NP/PA.                     Clinical Impression:       ICD-10-CM ICD-9-CM   1. Nephrolithiasis N20.0 592.0   2. Left flank pain R10.9 789.09   3. Nausea R11.0 787.02         Disposition:   Disposition: Discharged  Condition: Stable                        HALEY Liz-C  05/07/19 [id number]

## 2019-05-08 NOTE — ED NOTES
Patient identifiers for Lelia Mendoza 53 y.o. female checked and correct.  Chief Complaint   Patient presents with    Flank Pain     C/o L flank pain that radiates into L abdomen since 1600. Denies dysuria/hematuria. Endorses nausea.      Past Medical History:   Diagnosis Date    Hyperlipidemia LDL goal < 130     Hypertension     Migraine     over 10 years but not so bad lately    Obstructive sleep apnea 6/4/2015    PHONG on CPAP     6/2015     Allergies reported:   Review of patient's allergies indicates:   Allergen Reactions    Lisinopril Swelling     Swelling of lips         LOC: Patient is awake, alert, and aware of environment with an appropriate affect. Patient is oriented x 3 and speaking appropriately.  APPEARANCE: Patient is restless and appears to be in pain. Patient is clean and well groomed, patient's clothing is properly fastened.  SKIN: The skin is warm and dry. Patient has normal skin turgor and moist mucus membranes. Skin is intact; no bruising or breakdown noted.  MUSKULOSKELETAL: Patient is moving all extremities well, no obvious deformities noted. Pulses intact.   RESPIRATORY: Airway is open and patent. Respirations are spontaneous and non-labored with normal effort and rate, BBS=clear  ABDOMEN: No distention noted. Bowel sounds active in all 4 quadrants. Soft and non-tender upon palpation.

## 2019-05-08 NOTE — DISCHARGE INSTRUCTIONS
Your abdominal CT showed evidence of a 1.3 cm obstructing kidney stone. If also showed evidence of diverticulosis without associated infection. You should follow up with Urology within the next 3-4 days. You should take tamsulosin to help pass the stone. Please use urinary strainer to catch the stone. If this does happen, please bring this to your Urology visit to have it tested. Take Norco for pain relief and Zofran for nausea relief. You should not operate machinery or drive while taking Norco. Return to the ER if you develop any fevers, significant blood in your urine, dizziness, lightheadedness, or any other concerning symptoms.

## 2019-06-02 ENCOUNTER — OFFICE VISIT (OUTPATIENT)
Dept: URGENT CARE | Facility: CLINIC | Age: 54
End: 2019-06-02
Payer: COMMERCIAL

## 2019-06-02 VITALS
HEART RATE: 64 BPM | HEIGHT: 60 IN | RESPIRATION RATE: 18 BRPM | WEIGHT: 227 LBS | BODY MASS INDEX: 44.57 KG/M2 | OXYGEN SATURATION: 98 % | TEMPERATURE: 98 F

## 2019-06-02 DIAGNOSIS — K04.7 DENTAL ABSCESS: Primary | ICD-10-CM

## 2019-06-02 PROCEDURE — 3008F BODY MASS INDEX DOCD: CPT | Mod: CPTII,S$GLB,, | Performed by: PHYSICIAN ASSISTANT

## 2019-06-02 PROCEDURE — 99214 PR OFFICE/OUTPT VISIT, EST, LEVL IV, 30-39 MIN: ICD-10-PCS | Mod: S$GLB,,, | Performed by: PHYSICIAN ASSISTANT

## 2019-06-02 PROCEDURE — 3008F PR BODY MASS INDEX (BMI) DOCUMENTED: ICD-10-PCS | Mod: CPTII,S$GLB,, | Performed by: PHYSICIAN ASSISTANT

## 2019-06-02 PROCEDURE — 99214 OFFICE O/P EST MOD 30 MIN: CPT | Mod: S$GLB,,, | Performed by: PHYSICIAN ASSISTANT

## 2019-06-02 RX ORDER — IBUPROFEN 600 MG/1
600 TABLET ORAL EVERY 6 HOURS PRN
Qty: 20 TABLET | Refills: 0 | Status: SHIPPED | OUTPATIENT
Start: 2019-06-02 | End: 2019-06-07

## 2019-06-02 RX ORDER — PENICILLIN V POTASSIUM 500 MG/1
500 TABLET, FILM COATED ORAL EVERY 8 HOURS
Qty: 21 TABLET | Refills: 0 | Status: SHIPPED | OUTPATIENT
Start: 2019-06-02 | End: 2019-06-09

## 2019-06-02 NOTE — PROGRESS NOTES
Subjective:       Patient ID: Lelia Mendoza is a 53 y.o. female.    Vitals:  height is 5' (1.524 m) and weight is 103 kg (227 lb). Her temperature is 98.3 °F (36.8 °C). Her pulse is 64. Her respiration is 18 and oxygen saturation is 98%.     Chief Complaint: Dental Pain    Pt states that her tooth ache started Wednesday and gradually worsening.     Dental Pain    This is a new problem. The current episode started in the past 7 days. The problem occurs constantly. The problem has been gradually worsening. The pain is at a severity of 10/10. Associated symptoms include facial pain and thermal sensitivity. Pertinent negatives include no fever. She has tried NSAIDs for the symptoms. The treatment provided mild relief.       Constitution: Negative for chills, fatigue and fever.   HENT: Negative for congestion and sore throat.    Neck: Negative for painful lymph nodes.   Cardiovascular: Negative for chest pain and leg swelling.   Eyes: Negative for double vision and blurred vision.   Respiratory: Negative for cough and shortness of breath.    Gastrointestinal: Negative for nausea, vomiting and diarrhea.   Genitourinary: Negative for dysuria, frequency, urgency and history of kidney stones.   Musculoskeletal: Positive for pain. Negative for joint pain, joint swelling, muscle cramps and muscle ache.   Skin: Negative for color change, pale, rash and bruising.   Allergic/Immunologic: Negative for seasonal allergies.   Neurological: Positive for light-headedness. Negative for dizziness, history of vertigo, passing out and headaches.   Hematologic/Lymphatic: Negative for swollen lymph nodes.   Psychiatric/Behavioral: Negative for nervous/anxious, sleep disturbance and depression. The patient is not nervous/anxious.        Objective:      Physical Exam   Constitutional: She is oriented to person, place, and time. She appears well-developed and well-nourished. She is cooperative.  Non-toxic appearance. She does not have a sickly  appearance. She does not appear ill. No distress.   HENT:   Head: Normocephalic and atraumatic.   Right Ear: Hearing, tympanic membrane, external ear and ear canal normal.   Left Ear: Hearing, tympanic membrane, external ear and ear canal normal.   Nose: Nose normal. No mucosal edema, rhinorrhea or nasal deformity. No epistaxis. Right sinus exhibits no maxillary sinus tenderness and no frontal sinus tenderness. Left sinus exhibits no maxillary sinus tenderness and no frontal sinus tenderness.   Mouth/Throat: Uvula is midline, oropharynx is clear and moist and mucous membranes are normal. No trismus in the jaw. Abnormal dentition. Dental caries present. No uvula swelling. No posterior oropharyngeal erythema.       Eyes: Pupils are equal, round, and reactive to light. Conjunctivae and lids are normal. No scleral icterus.   Sclera clear bilat   Neck: Trachea normal, full passive range of motion without pain and phonation normal. Neck supple.   Cardiovascular: Normal rate, regular rhythm, normal heart sounds, intact distal pulses and normal pulses.   Pulmonary/Chest: Effort normal and breath sounds normal. No respiratory distress.   Abdominal: Normal appearance.   Musculoskeletal: Normal range of motion. She exhibits no edema or deformity.   Lymphadenopathy:     She has no cervical adenopathy.   Neurological: She is alert and oriented to person, place, and time. She exhibits normal muscle tone. Coordination normal.   Skin: Skin is warm, dry and intact. She is not diaphoretic. No pallor.   Psychiatric: She has a normal mood and affect. Her speech is normal and behavior is normal. Judgment and thought content normal. Cognition and memory are normal.   Nursing note and vitals reviewed.      Patient likely has a dental abscess. Will start on penicillin. Stressed importance of close follow up with dentist. Patient voiced understanding and is in agreement with current treatment plan.    Assessment:       1. Dental abscess         Plan:         Dental abscess  -     ibuprofen (ADVIL,MOTRIN) 600 MG tablet; Take 1 tablet (600 mg total) by mouth every 6 (six) hours as needed for Pain or Temperature greater than (100.4).  Dispense: 20 tablet; Refill: 0  -     penicillin v potassium (VEETID) 500 MG tablet; Take 1 tablet (500 mg total) by mouth every 8 (eight) hours. for 7 days  Dispense: 21 tablet; Refill: 0      Patient Instructions     FOLLOW UP WITH YOUR DENTIST AS SOON AS POSSIBLE     If your condition worsens or fails to improve we recommend that you receive another evaluation at the ER immediately or contact your PCP to discuss your concerns or return here.   You must understand that you've received an urgent care treatment only and that you may be released before all your medical problems are known or treated. You the patient will arrange for followup care as instructed.      You should seek ER treatment if fever, increase pain, swelling or other signs of increasing infection.      You were prescribed oral antibiotics, please take them to completion.  -  Take antibiotics with meals      Tylenol or ibuprofen can also be used as directed for pain unless you have an allergy to them or medical condition such as stomach ulcers, kidney or liver disease or blood thinners etc for which you should not be taking these type of medications      Abscess (Antibiotic Treatment Only)  An abscess (sometimes called a boil) happens when bacteria get trapped under the skin and start to grow. Pus forms inside the abscess as the body responds to the bacteria. An abscess can happen with an insect bite, ingrown hair, blocked oil gland, pimple, cyst, or puncture wound.  In the early stages, your wound may be red and tender. For this stage, you may get antibiotics. If the abscess does not get better with antibiotics, it will need to be drained with a small cut.  Home care  These tips will help you care for your abscess at home:  · Soak the wound in hot water or  apply hot packs (small towel soaked in hot water) to the area for 20 minutes at a time. Do this 3 to 4 times a day.  · Do not cut, squeeze, or pop the boil yourself.  · Apply antibiotic cream or ointment to the skin 3 to 4 times a day, unless something else was prescribed. Some ointments include an antibiotic plus a pain reliever.  · If your doctor prescribed antibiotics, do not stop taking them until you have finished the medicine or the doctor tells you to stop.  · You may use an over-the-counter pain medicine to control pain, unless another pain medicine was prescribed. If you have chronic liver or kidney disease or ever had a stomach ulcer or gastrointestinal bleeding, talk with your doctor before using these any of these.  Follow-up care  Follow up with your healthcare provider, or as advised. Check your wound each day for the signs of worsening infection listed below.  When to seek medical advice  Get prompt medical attention if any of these occur:  · An increase in redness or swelling  · Red streaks in the skin leading away from the abscess  · An increase in local pain or swelling  · Fever of 100.4ºF (38ºC) or higher, or as directed by your healthcare provider  · Pus or fluid coming from the abscess  · Boil returns after getting better  Date Last Reviewed: 9/1/2016  © 9276-6335 [employer]. [address] [place] [address], [place] [address]. All rights reserved. This information is not intended as a substitute for professional medical care. Always follow your healthcare professional's instructions.

## 2019-06-02 NOTE — PATIENT INSTRUCTIONS
FOLLOW UP WITH YOUR DENTIST AS SOON AS POSSIBLE     If your condition worsens or fails to improve we recommend that you receive another evaluation at the ER immediately or contact your PCP to discuss your concerns or return here.   You must understand that you've received an urgent care treatment only and that you may be released before all your medical problems are known or treated. You the patient will arrange for followup care as instructed.      You should seek ER treatment if fever, increase pain, swelling or other signs of increasing infection.      You were prescribed oral antibiotics, please take them to completion.  -  Take antibiotics with meals      Tylenol or ibuprofen can also be used as directed for pain unless you have an allergy to them or medical condition such as stomach ulcers, kidney or liver disease or blood thinners etc for which you should not be taking these type of medications      Abscess (Antibiotic Treatment Only)  An abscess (sometimes called a boil) happens when bacteria get trapped under the skin and start to grow. Pus forms inside the abscess as the body responds to the bacteria. An abscess can happen with an insect bite, ingrown hair, blocked oil gland, pimple, cyst, or puncture wound.  In the early stages, your wound may be red and tender. For this stage, you may get antibiotics. If the abscess does not get better with antibiotics, it will need to be drained with a small cut.  Home care  These tips will help you care for your abscess at home:  · Soak the wound in hot water or apply hot packs (small towel soaked in hot water) to the area for 20 minutes at a time. Do this 3 to 4 times a day.  · Do not cut, squeeze, or pop the boil yourself.  · Apply antibiotic cream or ointment to the skin 3 to 4 times a day, unless something else was prescribed. Some ointments include an antibiotic plus a pain reliever.  · If your doctor prescribed antibiotics, do not stop taking them until you  have finished the medicine or the doctor tells you to stop.  · You may use an over-the-counter pain medicine to control pain, unless another pain medicine was prescribed. If you have chronic liver or kidney disease or ever had a stomach ulcer or gastrointestinal bleeding, talk with your doctor before using these any of these.  Follow-up care  Follow up with your healthcare provider, or as advised. Check your wound each day for the signs of worsening infection listed below.  When to seek medical advice  Get prompt medical attention if any of these occur:  · An increase in redness or swelling  · Red streaks in the skin leading away from the abscess  · An increase in local pain or swelling  · Fever of 100.4ºF (38ºC) or higher, or as directed by your healthcare provider  · Pus or fluid coming from the abscess  · Boil returns after getting better  Date Last Reviewed: 9/1/2016  © 6397-5665 [employer]. [address] [place] [address], [place] [address]. All rights reserved. This information is not intended as a substitute for professional medical care. Always follow your healthcare professional's instructions.

## 2019-06-07 ENCOUNTER — LAB VISIT (OUTPATIENT)
Dept: LAB | Facility: HOSPITAL | Age: 54
End: 2019-06-07
Payer: COMMERCIAL

## 2019-06-07 ENCOUNTER — CLINICAL SUPPORT (OUTPATIENT)
Dept: BARIATRICS | Facility: CLINIC | Age: 54
End: 2019-06-07
Payer: COMMERCIAL

## 2019-06-07 VITALS — WEIGHT: 226 LBS | BODY MASS INDEX: 44.13 KG/M2

## 2019-06-07 DIAGNOSIS — E66.01 MORBID OBESITY WITH BMI OF 40.0-44.9, ADULT: ICD-10-CM

## 2019-06-07 DIAGNOSIS — Z98.84 S/P LAPAROSCOPIC SLEEVE GASTRECTOMY: ICD-10-CM

## 2019-06-07 DIAGNOSIS — R63.5 WEIGHT GAIN: ICD-10-CM

## 2019-06-07 DIAGNOSIS — Z71.3 DIETARY COUNSELING: ICD-10-CM

## 2019-06-07 DIAGNOSIS — I10 ESSENTIAL HYPERTENSION: ICD-10-CM

## 2019-06-07 LAB
25(OH)D3+25(OH)D2 SERPL-MCNC: 28 NG/ML (ref 30–96)
ALBUMIN SERPL BCP-MCNC: 4.1 G/DL (ref 3.5–5.2)
ALP SERPL-CCNC: 99 U/L (ref 55–135)
ALT SERPL W/O P-5'-P-CCNC: 16 U/L (ref 10–44)
ANION GAP SERPL CALC-SCNC: 8 MMOL/L (ref 8–16)
AST SERPL-CCNC: 14 U/L (ref 10–40)
BASOPHILS # BLD AUTO: 0.03 K/UL (ref 0–0.2)
BASOPHILS NFR BLD: 0.6 % (ref 0–1.9)
BILIRUB SERPL-MCNC: 0.6 MG/DL (ref 0.1–1)
BUN SERPL-MCNC: 16 MG/DL (ref 6–20)
CALCIUM SERPL-MCNC: 9.9 MG/DL (ref 8.7–10.5)
CHLORIDE SERPL-SCNC: 109 MMOL/L (ref 95–110)
CHOLEST SERPL-MCNC: 261 MG/DL (ref 120–199)
CHOLEST/HDLC SERPL: 3.1 {RATIO} (ref 2–5)
CO2 SERPL-SCNC: 27 MMOL/L (ref 23–29)
CREAT SERPL-MCNC: 0.8 MG/DL (ref 0.5–1.4)
DIFFERENTIAL METHOD: ABNORMAL
EOSINOPHIL # BLD AUTO: 0.1 K/UL (ref 0–0.5)
EOSINOPHIL NFR BLD: 2.5 % (ref 0–8)
ERYTHROCYTE [DISTWIDTH] IN BLOOD BY AUTOMATED COUNT: 13.7 % (ref 11.5–14.5)
EST. GFR  (AFRICAN AMERICAN): >60 ML/MIN/1.73 M^2
EST. GFR  (NON AFRICAN AMERICAN): >60 ML/MIN/1.73 M^2
GLUCOSE SERPL-MCNC: 85 MG/DL (ref 70–110)
HCT VFR BLD AUTO: 45.9 % (ref 37–48.5)
HDLC SERPL-MCNC: 85 MG/DL (ref 40–75)
HDLC SERPL: 32.6 % (ref 20–50)
HGB BLD-MCNC: 14.3 G/DL (ref 12–16)
IMM GRANULOCYTES # BLD AUTO: 0.01 K/UL (ref 0–0.04)
IMM GRANULOCYTES NFR BLD AUTO: 0.2 % (ref 0–0.5)
IRON SERPL-MCNC: 90 UG/DL (ref 30–160)
LDLC SERPL CALC-MCNC: 163.2 MG/DL (ref 63–159)
LYMPHOCYTES # BLD AUTO: 2.3 K/UL (ref 1–4.8)
LYMPHOCYTES NFR BLD: 44.5 % (ref 18–48)
MCH RBC QN AUTO: 29.3 PG (ref 27–31)
MCHC RBC AUTO-ENTMCNC: 31.2 G/DL (ref 32–36)
MCV RBC AUTO: 94 FL (ref 82–98)
MONOCYTES # BLD AUTO: 0.3 K/UL (ref 0.3–1)
MONOCYTES NFR BLD: 5.3 % (ref 4–15)
NEUTROPHILS # BLD AUTO: 2.5 K/UL (ref 1.8–7.7)
NEUTROPHILS NFR BLD: 46.9 % (ref 38–73)
NONHDLC SERPL-MCNC: 176 MG/DL
NRBC BLD-RTO: 0 /100 WBC
PLATELET # BLD AUTO: 244 K/UL (ref 150–350)
PMV BLD AUTO: 10.1 FL (ref 9.2–12.9)
POTASSIUM SERPL-SCNC: 4.3 MMOL/L (ref 3.5–5.1)
PROT SERPL-MCNC: 7.4 G/DL (ref 6–8.4)
RBC # BLD AUTO: 4.88 M/UL (ref 4–5.4)
SATURATED IRON: 25 % (ref 20–50)
SODIUM SERPL-SCNC: 144 MMOL/L (ref 136–145)
TOTAL IRON BINDING CAPACITY: 364 UG/DL (ref 250–450)
TRANSFERRIN SERPL-MCNC: 246 MG/DL (ref 200–375)
TRIGL SERPL-MCNC: 64 MG/DL (ref 30–150)
VIT B12 SERPL-MCNC: 439 PG/ML (ref 210–950)
WBC # BLD AUTO: 5.24 K/UL (ref 3.9–12.7)

## 2019-06-07 PROCEDURE — 82607 VITAMIN B-12: CPT

## 2019-06-07 PROCEDURE — 36415 COLL VENOUS BLD VENIPUNCTURE: CPT

## 2019-06-07 PROCEDURE — 97803 MED NUTRITION INDIV SUBSEQ: CPT | Mod: S$GLB,,, | Performed by: DIETITIAN, REGISTERED

## 2019-06-07 PROCEDURE — 83540 ASSAY OF IRON: CPT

## 2019-06-07 PROCEDURE — 99999 PR PBB SHADOW E&M-EST. PATIENT-LVL II: CPT | Mod: PBBFAC,,, | Performed by: DIETITIAN, REGISTERED

## 2019-06-07 PROCEDURE — 82306 VITAMIN D 25 HYDROXY: CPT

## 2019-06-07 PROCEDURE — 99999 PR PBB SHADOW E&M-EST. PATIENT-LVL II: ICD-10-PCS | Mod: PBBFAC,,, | Performed by: DIETITIAN, REGISTERED

## 2019-06-07 PROCEDURE — 99499 NO LOS: ICD-10-PCS | Mod: S$GLB,,, | Performed by: DIETITIAN, REGISTERED

## 2019-06-07 PROCEDURE — 85025 COMPLETE CBC W/AUTO DIFF WBC: CPT

## 2019-06-07 PROCEDURE — 80053 COMPREHEN METABOLIC PANEL: CPT

## 2019-06-07 PROCEDURE — 80061 LIPID PANEL: CPT

## 2019-06-07 PROCEDURE — 97803 PR MED NUTR THER, SUBSQ, INDIV, EA 15 MIN: ICD-10-PCS | Mod: S$GLB,,, | Performed by: DIETITIAN, REGISTERED

## 2019-06-07 PROCEDURE — 99499 UNLISTED E&M SERVICE: CPT | Mod: S$GLB,,, | Performed by: DIETITIAN, REGISTERED

## 2019-06-07 NOTE — PROGRESS NOTES
NUTRITION NOTE     Referring Physician: Sami Leigh M.D.  Reason for MNT Referral: Established pt follow-up with wt regain s/p Gastric Sleeve     Patient presents for f/u visit with 2 lbs weight loss over the past month. She states that she has cut way back on the chips and the wine. Her diet is still consistent with small portions, several protein centered meals daily. Still drinks 1-2 Premier shakes daily. She was not able to start the exercise yet because she was suffering with kidney stones. She needs hip surgery, but surgeon says he won't operate until she loses 100 more pounds. She did call the      CLINICAL DATA:  53 y.o. female.     Current Weight: 225 lbs  Weight Change Since Initial Visit: - 30 lbs  Ideal Body Weight: 126 lbs  Body mass index is 44  EWL: 22%     NUTRITIONAL NEEDS:  800-1200 Calories (bariatric diet)   Grams Protein     CURRENT DIET:  Reduced-calorie diet. Low carb  Diet Recall: Food records are present. 3 days.     - 1 boiled egg  - garden salad with deli turkey  - 2 baked chicken legs, broccoli with cheese       Diet Includes:   Meal Pattern: Same.  Protein Supplements: 1-2 per day.  Snacking: Adequate. Snacks include mostly healthy choices. Boiled eggs, individual hummus with pretzel crisps, peanuts.  Vegetables: Likes a variety. Eats daily.  Fruits: Likes a variety. Eats daily.  Beverages: water and sugar-free beverages.   Dining Out:  Rarely Mostly take-out. Thin crust pizza.  Cooking at home: Daily. Mostly baked, grilled and smothered meat, fish and vegetables.     CURRENT EXERCISE:  None.      Restrictions to exercise: pain from kidney stones. Also bad hip     Vitamins / Minerals / Herbs:   Off track     Social:  Works regular daytime shifts. 6am-9pm.  Alcohol: Socially. Reduced lately. Twice this month, drank 1-2 glasses wine  Smoking: None.     ASSESSMENT:  Patient demonstrates willingness to change lifestyle habits as evidenced by weight loss, daily food logs,  dietary changes, including protein drinks, increased fruits, increased vegetables, reduced dining out and healthier cooking at home.     Doing fairly well with working on greatest challenges (starchy CHO, snacking at night and emotional eating).     Barriers to Education:  none  Stage of Change:  determination     NUTRITION DIAGNOSIS:  Obesity related to Excessive carbohydrate intake as evidence by BMI.  Status: Improved     PLAN:     Diet: Maintain excellent diet plan.  Increase protein by resuming protein drinks, 2 per day. Premier shake. Also recommended trying Nectar protein powder + water.     Exercise: Begin. Start with 5 min on Elliptical. Work up to 30 min if possible. Also recommend seated chair exercises using light weights or stretchy bands.     Behavior Modification: Continue to document food & activity logs daily. Phone [facility].     Return to clinic in one month.     Communicated nutrition plan with bariatric team.     SESSION TIME:  30 minutes

## 2019-06-07 NOTE — PATIENT INSTRUCTIONS
PLAN:     Diet: Maintain excellent diet plan.  Increase protein by resuming protein drinks, 2 per day. Premier shake. Also recommended trying Nectar protein powder + water.     Exercise: Begin. Start with 5 min on Elliptical. Work up to 30 min if possible. Also recommend seated chair exercises using light weights or stretchy bands.     Behavior Modification: Continue to document food & activity logs daily. Phone [facility].     Return to clinic in one month.

## 2019-08-29 ENCOUNTER — OFFICE VISIT (OUTPATIENT)
Dept: INTERNAL MEDICINE | Facility: CLINIC | Age: 54
End: 2019-08-29
Payer: COMMERCIAL

## 2019-08-29 VITALS
TEMPERATURE: 99 F | DIASTOLIC BLOOD PRESSURE: 88 MMHG | RESPIRATION RATE: 18 BRPM | WEIGHT: 229.94 LBS | HEIGHT: 60 IN | SYSTOLIC BLOOD PRESSURE: 130 MMHG | HEART RATE: 75 BPM | BODY MASS INDEX: 45.14 KG/M2

## 2019-08-29 DIAGNOSIS — Z02.89 ENCOUNTER FOR COMPLETION OF FORM WITH PATIENT: ICD-10-CM
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
## 2019-08-29 PROCEDURE — 3008F BODY MASS INDEX DOCD: CPT | Mod: CPTII,S$GLB,, | Performed by: INTERNAL MEDICINE

## 2019-08-29 PROCEDURE — 3075F SYST BP GE 130 - 139MM HG: CPT | Mod: CPTII,S$GLB,, | Performed by: INTERNAL MEDICINE

## 2019-08-29 PROCEDURE — 3008F PR BODY MASS INDEX (BMI) DOCUMENTED: ICD-10-PCS | Mod: CPTII,S$GLB,, | Performed by: INTERNAL MEDICINE

## 2019-08-29 PROCEDURE — 3079F PR MOST RECENT DIASTOLIC BLOOD PRESSURE 80-89 MM HG: ICD-10-PCS | Mod: CPTII,S$GLB,, | Performed by: INTERNAL MEDICINE

## 2019-08-29 PROCEDURE — 99213 OFFICE O/P EST LOW 20 MIN: CPT | Mod: S$GLB,,, | Performed by: INTERNAL MEDICINE

## 2019-08-29 PROCEDURE — 99213 PR OFFICE/OUTPT VISIT, EST, LEVL III, 20-29 MIN: ICD-10-PCS | Mod: S$GLB,,, | Performed by: INTERNAL MEDICINE

## 2019-08-29 PROCEDURE — 99999 PR PBB SHADOW E&M-EST. PATIENT-LVL III: CPT | Mod: PBBFAC,,, | Performed by: INTERNAL MEDICINE

## 2019-08-29 PROCEDURE — 3075F PR MOST RECENT SYSTOLIC BLOOD PRESS GE 130-139MM HG: ICD-10-PCS | Mod: CPTII,S$GLB,, | Performed by: INTERNAL MEDICINE

## 2019-08-29 PROCEDURE — 99999 PR PBB SHADOW E&M-EST. PATIENT-LVL III: ICD-10-PCS | Mod: PBBFAC,,, | Performed by: INTERNAL MEDICINE

## 2019-08-29 PROCEDURE — 3079F DIAST BP 80-89 MM HG: CPT | Mod: CPTII,S$GLB,, | Performed by: INTERNAL MEDICINE

## 2019-08-29 NOTE — PROGRESS NOTES
Subjective:       Patient ID: Lelia Mendoza is a 54 y.o. female.    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   Review of Systems   Constitutional: Negative for activity change, appetite change, chills, diaphoresis, fatigue, fever and unexpected weight change.   HENT: Negative for postnasal drip, rhinorrhea, sinus pressure, sneezing, sore throat, trouble swallowing and voice change.    Respiratory: Negative for cough, shortness of breath and wheezing.    Cardiovascular: Negative for chest pain, palpitations and leg swelling.   Gastrointestinal: Negative for abdominal pain, blood in stool, constipation, diarrhea, nausea and vomiting.   Genitourinary: Negative for dysuria.   Musculoskeletal: Negative for arthralgias and myalgias.   Skin: Negative for rash and wound.   Allergic/Immunologic: Negative for environmental allergies and food allergies.   Hematological: Negative for adenopathy. Does not bruise/bleed easily.       Objective:      Physical Exam   Constitutional: She is oriented to person, place, and time. She appears well-developed and well-nourished. No distress.   HENT:   Head: Normocephalic and atraumatic.   Mouth/Throat: No oropharyngeal exudate.   Eyes: Pupils are equal, round, and reactive to light. Conjunctivae and EOM are normal. Right eye exhibits no discharge. Left eye exhibits no discharge. No scleral icterus.   Neck: Neck supple. No JVD present.   Cardiovascular: Normal rate, regular rhythm, normal heart sounds and intact distal pulses.   Pulmonary/Chest: Effort normal and breath sounds normal. No respiratory distress. She has no wheezes. She has no rales.   Musculoskeletal: She exhibits no edema.   Lymphadenopathy:     She has no cervical adenopathy.   Neurological: She is alert and oriented to person, place, and time.   Skin: Skin is  warm and dry. No rash noted. She is not diaphoretic. No pallor.       Assessment:       1. Activity, other caregiving    2. Encounter for completion of form with patient        Plan:    1. FMLA papers completed with pt

## 2019-09-26 ENCOUNTER — TELEPHONE (OUTPATIENT)
Dept: INTERNAL MEDICINE | Facility: CLINIC | Age: 54
End: 2019-09-26

## 2019-09-26 ENCOUNTER — OFFICE VISIT (OUTPATIENT)
Dept: INTERNAL MEDICINE | Facility: CLINIC | Age: 54
End: 2019-09-26
Payer: COMMERCIAL

## 2019-09-26 ENCOUNTER — HOSPITAL ENCOUNTER (OUTPATIENT)
Dept: RADIOLOGY | Facility: OTHER | Age: 54
Discharge: HOME | End: 2019-09-26
Attending: INTERNAL MEDICINE
Payer: COMMERCIAL

## 2019-09-26 VITALS
WEIGHT: 229.94 LBS | HEART RATE: 56 BPM | HEIGHT: 60 IN | BODY MASS INDEX: 45.14 KG/M2 | SYSTOLIC BLOOD PRESSURE: 136 MMHG | DIASTOLIC BLOOD PRESSURE: 88 MMHG | RESPIRATION RATE: 18 BRPM | TEMPERATURE: 98 F

## 2019-09-26 DIAGNOSIS — N20.0 LEFT NEPHROLITHIASIS: Primary | ICD-10-CM

## 2019-09-26 DIAGNOSIS — R10.9 LEFT FLANK PAIN: Primary | ICD-10-CM

## 2019-09-26 DIAGNOSIS — R10.9 LEFT FLANK PAIN: ICD-10-CM

## 2019-09-26 DIAGNOSIS — Z87.442 HISTORY OF NEPHROLITHIASIS: ICD-10-CM

## 2019-09-26 PROCEDURE — 99999 PR PBB SHADOW E&M-EST. PATIENT-LVL III: ICD-10-PCS | Mod: PBBFAC,,, | Performed by: INTERNAL MEDICINE

## 2019-09-26 PROCEDURE — 74176 CT RENAL STONE STUDY ABD PELVIS WO: ICD-10-PCS | Mod: 26,,, | Performed by: RADIOLOGY

## 2019-09-26 PROCEDURE — 3008F BODY MASS INDEX DOCD: CPT | Mod: CPTII,S$GLB,, | Performed by: INTERNAL MEDICINE

## 2019-09-26 PROCEDURE — 3075F SYST BP GE 130 - 139MM HG: CPT | Mod: CPTII,S$GLB,, | Performed by: INTERNAL MEDICINE

## 2019-09-26 PROCEDURE — 99214 OFFICE O/P EST MOD 30 MIN: CPT | Mod: S$GLB,,, | Performed by: INTERNAL MEDICINE

## 2019-09-26 PROCEDURE — 74176 CT ABD & PELVIS W/O CONTRAST: CPT | Mod: 26,,, | Performed by: RADIOLOGY

## 2019-09-26 PROCEDURE — 99214 PR OFFICE/OUTPT VISIT, EST, LEVL IV, 30-39 MIN: ICD-10-PCS | Mod: S$GLB,,, | Performed by: INTERNAL MEDICINE

## 2019-09-26 PROCEDURE — 3008F PR BODY MASS INDEX (BMI) DOCUMENTED: ICD-10-PCS | Mod: CPTII,S$GLB,, | Performed by: INTERNAL MEDICINE

## 2019-09-26 PROCEDURE — 3079F PR MOST RECENT DIASTOLIC BLOOD PRESSURE 80-89 MM HG: ICD-10-PCS | Mod: CPTII,S$GLB,, | Performed by: INTERNAL MEDICINE

## 2019-09-26 PROCEDURE — 74176 CT ABD & PELVIS W/O CONTRAST: CPT | Mod: TC

## 2019-09-26 PROCEDURE — 3079F DIAST BP 80-89 MM HG: CPT | Mod: CPTII,S$GLB,, | Performed by: INTERNAL MEDICINE

## 2019-09-26 PROCEDURE — 99999 PR PBB SHADOW E&M-EST. PATIENT-LVL III: CPT | Mod: PBBFAC,,, | Performed by: INTERNAL MEDICINE

## 2019-09-26 PROCEDURE — 3075F PR MOST RECENT SYSTOLIC BLOOD PRESS GE 130-139MM HG: ICD-10-PCS | Mod: CPTII,S$GLB,, | Performed by: INTERNAL MEDICINE

## 2019-09-26 RX ORDER — TRAMADOL HYDROCHLORIDE 50 MG/1
TABLET ORAL
Qty: 20 TABLET | Refills: 0 | Status: SHIPPED | OUTPATIENT
Start: 2019-09-26 | End: 2019-11-12 | Stop reason: CLARIF

## 2019-09-26 NOTE — PROGRESS NOTES
Subjective:       Patient ID: Lelia Mendoza is a 54 y.o. female.    Chief Complaint: Flank Pain (left )    HPI   Pt with hx of left sided nephrolithiasis(5/19) is here for evaluation of recurrent left sided flank pain since yesterday morning which has been constant described as sharp which has improved some by today. She denies any dysuria, hematuria, fevers/chills. Some relief with NSAIDs.   Review of Systems   Constitutional: Negative for activity change, appetite change, chills, diaphoresis, fatigue, fever and unexpected weight change.   HENT: Negative for postnasal drip, rhinorrhea, sinus pressure, sneezing, sore throat, trouble swallowing and voice change.    Respiratory: Negative for cough, shortness of breath and wheezing.    Cardiovascular: Negative for chest pain, palpitations and leg swelling.   Gastrointestinal: Negative for abdominal pain, blood in stool, constipation, diarrhea, nausea and vomiting.   Genitourinary: Negative for dysuria.   Musculoskeletal: Positive for back pain. Negative for arthralgias and myalgias.   Skin: Negative for rash and wound.   Allergic/Immunologic: Negative for environmental allergies and food allergies.   Hematological: Negative for adenopathy. Does not bruise/bleed easily.       Objective:      Physical Exam   Constitutional: She is oriented to person, place, and time. She appears well-developed and well-nourished. No distress.   HENT:   Head: Normocephalic and atraumatic.   Eyes: Pupils are equal, round, and reactive to light. Conjunctivae and EOM are normal. Right eye exhibits no discharge. Left eye exhibits no discharge. No scleral icterus.   Neck: Neck supple. No JVD present.   Cardiovascular: Normal rate, regular rhythm, normal heart sounds and intact distal pulses.   Pulmonary/Chest: Effort normal and breath sounds normal. No respiratory distress. She has no wheezes. She has no rales.   Abdominal: Soft. Bowel sounds are normal. There is no tenderness.    Musculoskeletal: She exhibits no edema.        Back:    Lymphadenopathy:     She has no cervical adenopathy.   Neurological: She is alert and oriented to person, place, and time.   Skin: Skin is warm and dry. No rash noted. She is not diaphoretic. No pallor.       Assessment:       1. Left flank pain    2. History of nephrolithiasis        Plan:    1/2. CT renal stone study, UA with Cx          Continue NSAIDs and Rx Tramadol PRN severe pain          Push fluids

## 2019-10-08 ENCOUNTER — OFFICE VISIT (OUTPATIENT)
Dept: URGENT CARE | Facility: CLINIC | Age: 54
End: 2019-10-08
Payer: COMMERCIAL

## 2019-10-08 VITALS
OXYGEN SATURATION: 96 % | RESPIRATION RATE: 18 BRPM | HEIGHT: 60 IN | WEIGHT: 229 LBS | HEART RATE: 76 BPM | DIASTOLIC BLOOD PRESSURE: 105 MMHG | BODY MASS INDEX: 44.96 KG/M2 | SYSTOLIC BLOOD PRESSURE: 160 MMHG | TEMPERATURE: 99 F

## 2019-10-08 DIAGNOSIS — R39.89 SENSATION OF PRESSURE IN BLADDER AREA: Primary | ICD-10-CM

## 2019-10-08 LAB
BILIRUB UR QL STRIP: NEGATIVE
GLUCOSE UR QL STRIP: NEGATIVE
KETONES UR QL STRIP: NEGATIVE
LEUKOCYTE ESTERASE UR QL STRIP: NEGATIVE
PH, POC UA: 6.5 (ref 5–8)
POC BLOOD, URINE: POSITIVE
POC NITRATES, URINE: NEGATIVE
PROT UR QL STRIP: NEGATIVE
SP GR UR STRIP: 1.02 (ref 1–1.03)
UROBILINOGEN UR STRIP-ACNC: ABNORMAL (ref 0.1–1.1)

## 2019-10-08 PROCEDURE — 3077F SYST BP >= 140 MM HG: CPT | Mod: CPTII,S$GLB,, | Performed by: NURSE PRACTITIONER

## 2019-10-08 PROCEDURE — 99214 OFFICE O/P EST MOD 30 MIN: CPT | Mod: S$GLB,,, | Performed by: NURSE PRACTITIONER

## 2019-10-08 PROCEDURE — 81003 URINALYSIS AUTO W/O SCOPE: CPT | Mod: QW,S$GLB,, | Performed by: NURSE PRACTITIONER

## 2019-10-08 PROCEDURE — 3077F PR MOST RECENT SYSTOLIC BLOOD PRESSURE >= 140 MM HG: ICD-10-PCS | Mod: CPTII,S$GLB,, | Performed by: NURSE PRACTITIONER

## 2019-10-08 PROCEDURE — 3080F PR MOST RECENT DIASTOLIC BLOOD PRESSURE >= 90 MM HG: ICD-10-PCS | Mod: CPTII,S$GLB,, | Performed by: NURSE PRACTITIONER

## 2019-10-08 PROCEDURE — 99214 PR OFFICE/OUTPT VISIT, EST, LEVL IV, 30-39 MIN: ICD-10-PCS | Mod: S$GLB,,, | Performed by: NURSE PRACTITIONER

## 2019-10-08 PROCEDURE — 3008F PR BODY MASS INDEX (BMI) DOCUMENTED: ICD-10-PCS | Mod: CPTII,S$GLB,, | Performed by: NURSE PRACTITIONER

## 2019-10-08 PROCEDURE — 87086 URINE CULTURE/COLONY COUNT: CPT

## 2019-10-08 PROCEDURE — 81003 POCT URINALYSIS, DIPSTICK, AUTOMATED, W/O SCOPE: ICD-10-PCS | Mod: QW,S$GLB,, | Performed by: NURSE PRACTITIONER

## 2019-10-08 PROCEDURE — 3008F BODY MASS INDEX DOCD: CPT | Mod: CPTII,S$GLB,, | Performed by: NURSE PRACTITIONER

## 2019-10-08 PROCEDURE — 3080F DIAST BP >= 90 MM HG: CPT | Mod: CPTII,S$GLB,, | Performed by: NURSE PRACTITIONER

## 2019-10-08 NOTE — PROGRESS NOTES
Subjective:       Patient ID: Lelia Mendoza is a 54 y.o. female.    Vitals:  height is 5' (1.524 m) and weight is 103.9 kg (229 lb). Her temperature is 99.2 °F (37.3 °C). Her blood pressure is 160/105 (abnormal) and her pulse is 76. Her respiration is 18 and oxygen saturation is 96%.     Chief Complaint: Dysuria    Patient presents with complaint of intermittent feeling like her bladder maybe full since Monday morning.  She states that she has noticed that she has urinated more than normal, states that sometime she only urinates once a day and she noticed today that she urinates 3 to 4 times today.  Denies any urgency to urinate.  She states that her pelvic area is not painful.  Denies any abdominal pain.  Denies any nausea vomiting diarrhea or constipation.  Denies any vaginal discharge or concern for STDs.  She denies any dysuria with urination.  Denies any hematuria or flank pain. Patient repeatedly states it just feels full sometimes.  She is not having any distension and she does feel like she is able to empty her bladder.  She is not having any pain associated.    Dysuria    This is a new problem. The current episode started in the past 7 days. The problem occurs intermittently. The problem has been unchanged. The patient is experiencing no pain. There has been no fever. She is sexually active. Associated symptoms include frequency. Pertinent negatives include no behavior changes, chills, discharge, flank pain, hematuria, hesitancy, nausea, possible pregnancy, sweats, urgency, vomiting, weight loss, bubble bath use, constipation, rash or withholding. She has tried nothing for the symptoms. Her past medical history is significant for kidney stones. There is no history of recurrent UTIs.       Constitution: Negative for chills and fever.   Neck: Negative for painful lymph nodes.   Gastrointestinal: Negative for abdominal pain, abdominal bloating, nausea, vomiting, constipation and bowel incontinence.    Genitourinary: Positive for frequency. Negative for dysuria, urgency, urine decreased, flank pain, bladder incontinence, bed wetting, hematuria, history of kidney stones, painful menstruation, irregular menstruation, missed menses, heavy menstrual bleeding, ovarian cysts, genital trauma, vaginal pain, vaginal discharge, vaginal bleeding, vaginal odor, painful intercourse, genital sore, painful ejaculation and pelvic pain.   Musculoskeletal: Negative for back pain.   Skin: Negative for rash and lesion.   Hematologic/Lymphatic: Negative for swollen lymph nodes.       Objective:      Physical Exam   Constitutional: She is oriented to person, place, and time. She appears well-developed and well-nourished. No distress.   HENT:   Head: Normocephalic and atraumatic.   Right Ear: External ear normal.   Left Ear: External ear normal.   Nose: Nose normal.   Mouth/Throat: Mucous membranes are normal.   Eyes: Conjunctivae and lids are normal.   Neck: Trachea normal and full passive range of motion without pain. Neck supple.   Cardiovascular: Normal rate, regular rhythm and normal heart sounds.   Pulmonary/Chest: Effort normal and breath sounds normal. No respiratory distress.   Abdominal: Soft. Normal appearance and bowel sounds are normal. She exhibits no distension, no abdominal bruit, no pulsatile midline mass and no mass. There is no tenderness. There is no rigidity, no rebound, no guarding and no CVA tenderness.   Musculoskeletal: Normal range of motion. She exhibits no edema.   Neurological: She is alert and oriented to person, place, and time. She has normal strength.   Skin: Skin is warm, dry, intact, not diaphoretic and not pale.   Psychiatric: She has a normal mood and affect. Her speech is normal and behavior is normal. Judgment and thought content normal. Cognition and memory are normal.   Nursing note and vitals reviewed.    Results for orders placed or performed in visit on 10/08/19   POCT Urinalysis, Dipstick,  Automated, W/O Scope   Result Value Ref Range    POC Blood, Urine Positive (A) Negative    POC Bilirubin, Urine Negative Negative    POC Urobilinogen, Urine n 0.1 - 1.1    POC Ketones, Urine Negative Negative    POC Protein, Urine Negative Negative    POC Nitrates, Urine Negative Negative    POC Glucose, Urine Negative Negative    pH, UA 6.5 5 - 8    POC Specific Gravity, Urine 1.020 1.003 - 1.029    POC Leukocytes, Urine Negative Negative         Assessment:       1. Sensation of pressure in bladder area        Plan:         Sensation of pressure in bladder area  -     POCT Urinalysis, Dipstick, Automated, W/O Scope  -     Urine culture      Patient Instructions     Urine culture pending, we will call you in 3-5 days with results.  Rest.  Limit alcohol use.  Follow up closely with your PCP and urologist or OBGYN if symptoms persist.  Go to the ER for any worsening symptoms.  Overactive Bladder Syndrome (OAB)     Normally, urine stays in the bladder until a person decides to release it. With OAB, the bladder muscles contract involuntarily, causing a sudden urge to urinate and even urine leakage.   When the bladder muscle contract or squeeze involuntarily, it is called overactive bladder syndrome. This causes an intense urge to urinate, known as urgency. Urgency can occur many times during the day and night. If urine leaks with the urgency, it is called urge incontinence.  A disease that affects the bladder nerves, such as multiple sclerosis, can cause overactive bladder syndrome. Other conditions, such as urinary tract infection (UTI) or prostate problems in men, can also lead to OAB. But the exact cause is often not known.  How is overactive bladder syndrome diagnosed?  Your healthcare provider will examine you and ask about your symptoms and health history. You may also have one or more of the following:  · Urine test to take samples of urine and have them checked for problems.  · Urinary diary to record how much  fluid you take in and urinate out in a 3 day period.  · Bladder ultrasound to study the bladder as it empties. Ultrasound uses sound waves to create detailed images of the inside of the body.  · Cystoscopy to allow the healthcare provider to look for problems in the urinary tract. The test uses a thin, flexible scope called a cystoscope with a light and camera on the end. The scope is inserted into the urethra (the tube that carries urine out of the body).  · Urodynamic studies, a battery of tests designed to measure and record many aspects of urinary bladder function, including pressures, volume, and urine flow.  How is overactive bladder syndrome treated?  Treatment depends on the cause and severity of your OAB. Treatments may include the following:  · Changing urination habits may be suggested. For instance, your healthcare provider may suggest that you urinate as soon as you feel the urge. You may also need to limit how much fluid you have during the day.  · Exercising your pelvic muscles can help strengthen muscles used during urination. These exercises are called Kegels. They involve min as if you were stopping your urine stream and tightening your rectum as if trying not to pass gas. Your healthcare provider can help you learn how to do Kegels.  · Biofeedback to help you learn to control the movement of your bladder muscles. Sensors are placed on your abdomen. They turn signals given off by your muscles into lines on a computer screen.  · Medicine may be given to relax the bladder muscle. Medicine can also help ease bladder contractions, which reduces the urge to urinate.  · Neuromodulation may be done if medicine and behavioral changes dont work. Electrical pulses are sent to the sacral nerves (nerves that affect the pelvic area). These pulses help relieve OAB and urge incontinence.  · Surgery to make the bladder larger may be done in severe cases.  With treatment, OAB can be managed. A condition,  such as UTI, that has caused you to have OAB will be treated. Treatment may involve taking medicine for months or years. You may also need to make changes in your daily routine. This may include going to the bathroom more often than you think you need to. Or, you may need to cut back on caffeine and alcohol because these can make symptoms worse. Your healthcare provider can tell you more.     When to call your healthcare provider  Call the healthcare provider right away if you have any of the following:  · Fever of 100.4°F (38.0 °C) or higher   · No improvement with treatment  · Trouble urinating because of pain  · Back or abdominal pain   Date Last Reviewed: 1/1/2017  © 0809-3460 [employer]. [address] [place] [address], [place] [address]. All rights reserved. This information is not intended as a substitute for professional medical care. Always follow your healthcare professional's instructions.

## 2019-10-09 LAB — BACTERIA UR CULT: NORMAL

## 2019-10-09 NOTE — PATIENT INSTRUCTIONS
Urine culture pending, we will call you in 3-5 days with results.  Rest.  Limit alcohol use.  Follow up closely with your PCP and urologist or OBGYN if symptoms persist.  Go to the ER for any worsening symptoms.  Overactive Bladder Syndrome (OAB)     Normally, urine stays in the bladder until a person decides to release it. With OAB, the bladder muscles contract involuntarily, causing a sudden urge to urinate and even urine leakage.   When the bladder muscle contract or squeeze involuntarily, it is called overactive bladder syndrome. This causes an intense urge to urinate, known as urgency. Urgency can occur many times during the day and night. If urine leaks with the urgency, it is called urge incontinence.  A disease that affects the bladder nerves, such as multiple sclerosis, can cause overactive bladder syndrome. Other conditions, such as urinary tract infection (UTI) or prostate problems in men, can also lead to OAB. But the exact cause is often not known.  How is overactive bladder syndrome diagnosed?  Your healthcare provider will examine you and ask about your symptoms and health history. You may also have one or more of the following:  · Urine test to take samples of urine and have them checked for problems.  · Urinary diary to record how much fluid you take in and urinate out in a 3 day period.  · Bladder ultrasound to study the bladder as it empties. Ultrasound uses sound waves to create detailed images of the inside of the body.  · Cystoscopy to allow the healthcare provider to look for problems in the urinary tract. The test uses a thin, flexible scope called a cystoscope with a light and camera on the end. The scope is inserted into the urethra (the tube that carries urine out of the body).  · Urodynamic studies, a battery of tests designed to measure and record many aspects of urinary bladder function, including pressures, volume, and urine flow.  How is overactive bladder syndrome treated?  Treatment  depends on the cause and severity of your OAB. Treatments may include the following:  · Changing urination habits may be suggested. For instance, your healthcare provider may suggest that you urinate as soon as you feel the urge. You may also need to limit how much fluid you have during the day.  · Exercising your pelvic muscles can help strengthen muscles used during urination. These exercises are called Kegels. They involve min as if you were stopping your urine stream and tightening your rectum as if trying not to pass gas. Your healthcare provider can help you learn how to do Kegels.  · Biofeedback to help you learn to control the movement of your bladder muscles. Sensors are placed on your abdomen. They turn signals given off by your muscles into lines on a computer screen.  · Medicine may be given to relax the bladder muscle. Medicine can also help ease bladder contractions, which reduces the urge to urinate.  · Neuromodulation may be done if medicine and behavioral changes dont work. Electrical pulses are sent to the sacral nerves (nerves that affect the pelvic area). These pulses help relieve OAB and urge incontinence.  · Surgery to make the bladder larger may be done in severe cases.  With treatment, OAB can be managed. A condition, such as UTI, that has caused you to have OAB will be treated. Treatment may involve taking medicine for months or years. You may also need to make changes in your daily routine. This may include going to the bathroom more often than you think you need to. Or, you may need to cut back on caffeine and alcohol because these can make symptoms worse. Your healthcare provider can tell you more.     When to call your healthcare provider  Call the healthcare provider right away if you have any of the following:  · Fever of 100.4°F (38.0 °C) or higher   · No improvement with treatment  · Trouble urinating because of pain  · Back or abdominal pain   Date Last Reviewed:  1/1/2017  © 7807-6103 [employer]. [address] [place] [address], [place] [address]. All rights reserved. This information is not intended as a substitute for professional medical care. Always follow your healthcare professional's instructions.

## 2019-10-11 ENCOUNTER — OFFICE VISIT (OUTPATIENT)
Dept: UROLOGY | Facility: CLINIC | Age: 54
End: 2019-10-11
Payer: COMMERCIAL

## 2019-10-11 VITALS
DIASTOLIC BLOOD PRESSURE: 90 MMHG | SYSTOLIC BLOOD PRESSURE: 128 MMHG | BODY MASS INDEX: 44.97 KG/M2 | WEIGHT: 229.06 LBS | HEIGHT: 60 IN

## 2019-10-11 DIAGNOSIS — N20.0 NEPHROLITHIASIS: Primary | ICD-10-CM

## 2019-10-11 LAB
BILIRUB SERPL-MCNC: ABNORMAL MG/DL
BLOOD URINE, POC: ABNORMAL
COLOR, POC UA: YELLOW
GLUCOSE UR QL STRIP: ABNORMAL
KETONES UR QL STRIP: ABNORMAL
LEUKOCYTE ESTERASE URINE, POC: ABNORMAL
NITRITE, POC UA: ABNORMAL
PH, POC UA: 8
POC RESIDUAL URINE VOLUME: 17 ML (ref 0–100)
PROTEIN, POC: 30
SPECIFIC GRAVITY, POC UA: 1
UROBILINOGEN, POC UA: 1

## 2019-10-11 PROCEDURE — 99244 OFF/OP CNSLTJ NEW/EST MOD 40: CPT | Mod: 25,S$GLB,, | Performed by: UROLOGY

## 2019-10-11 PROCEDURE — 99244 PR OFFICE CONSULTATION,LEVEL IV: ICD-10-PCS | Mod: 25,S$GLB,, | Performed by: UROLOGY

## 2019-10-11 PROCEDURE — 87086 URINE CULTURE/COLONY COUNT: CPT

## 2019-10-11 PROCEDURE — 51798 POCT BLADDER SCAN: ICD-10-PCS | Mod: S$GLB,,, | Performed by: UROLOGY

## 2019-10-11 PROCEDURE — 81002 URINALYSIS NONAUTO W/O SCOPE: CPT | Mod: S$GLB,,, | Performed by: UROLOGY

## 2019-10-11 PROCEDURE — 81002 POCT URINE DIPSTICK WITHOUT MICROSCOPE: ICD-10-PCS | Mod: S$GLB,,, | Performed by: UROLOGY

## 2019-10-11 PROCEDURE — 51798 US URINE CAPACITY MEASURE: CPT | Mod: S$GLB,,, | Performed by: UROLOGY

## 2019-10-11 PROCEDURE — 99999 PR PBB SHADOW E&M-EST. PATIENT-LVL III: CPT | Mod: PBBFAC,,, | Performed by: UROLOGY

## 2019-10-11 PROCEDURE — 99999 PR PBB SHADOW E&M-EST. PATIENT-LVL III: ICD-10-PCS | Mod: PBBFAC,,, | Performed by: UROLOGY

## 2019-10-11 RX ORDER — CIPROFLOXACIN 2 MG/ML
400 INJECTION, SOLUTION INTRAVENOUS
Status: CANCELLED | OUTPATIENT
Start: 2019-10-11

## 2019-10-11 NOTE — PROGRESS NOTES
Subjective:      Lelia Mendoza is a 54 y.o. female who was referred by Marlo Pandey, *  for evaluation of nephrolithiasis.      Urolithiasis  Patient complains of left flank pain with radiation to the abdomen. Onset of symptoms was abrupt starting 5 months ago with completely resolved course since that time. Patient describes the pain as sharp, continuous and rated as severe. The patient has had nausea and no vomiting and no diaphoresis. There has been no fever or chills. The patient is not complaining of dysuria or frequency. Risk factors for urolithiasis: none.    The following portions of the patient's history were reviewed and updated as appropriate: allergies, current medications, past family history, past medical history, past social history, past surgical history and problem list.    Review of Systems  Constitutional: no fever or chills  ENT: no nasal congestion or sore throat  Respiratory: no cough or shortness of breath  Cardiovascular: no chest pain or palpitations  Gastrointestinal: no nausea or vomiting, tolerating diet  Genitourinary: as per HPI  Hematologic/Lymphatic: no easy bruising or lymphadenopathy  Musculoskeletal: no arthralgias or myalgias  Neurological: no seizures or tremors  Behavioral/Psych: no auditory or visual hallucinations     Objective:   Vital Signs:BP (!) 128/90 (BP Location: Left arm, Patient Position: Sitting, BP Method: Large (Manual))   Ht 5' (1.524 m)   Wt 103.9 kg (229 lb 0.9 oz)   LMP  (LMP Unknown)   BMI 44.73 kg/m²     Physical Exam   Physical Exam   Constitutional: She is oriented to person, place, and time. She appears well-developed and well-nourished. No distress.   HENT:   Head: Normocephalic and atraumatic.   Right Ear: External ear normal.   Left Ear: External ear normal.   Nose: Nose normal.   Mouth/Throat: Oropharynx is clear and moist.   Eyes: Conjunctivae and EOM are normal. Right eye exhibits no discharge. Left eye exhibits no discharge. No scleral  icterus.   Neck: Neck supple. No tracheal deviation present. No thyromegaly present.   Cardiovascular: Normal rate and regular rhythm. PMI is not displaced.   Pulmonary/Chest: Effort normal. No respiratory distress. She exhibits no tenderness.   Abdominal: Soft. She exhibits no distension. There is no tenderness. There is no CVA tenderness. No hernia.   Musculoskeletal: She exhibits no edema.   Neurological: She is alert and oriented to person, place, and time.   Skin: Skin is warm and dry. No rash noted. No erythema.   Psychiatric: She has a normal mood and affect. Her speech is normal and behavior is normal. Judgment and thought content normal. Her mood appears not anxious. Cognition and memory are normal.      Lab Review   Urinalysis demonstrates negative for all components  Lab Results   Component Value Date    WBC 5.24 06/07/2019    HGB 14.3 06/07/2019    HCT 45.9 06/07/2019    MCV 94 06/07/2019     06/07/2019     Lab Results   Component Value Date    CREATININE 0.8 06/07/2019    BUN 16 06/07/2019       Imaging (all images personally reviewed; agree with report below)  Results for orders placed during the hospital encounter of 09/26/19   CT Renal Stone Study ABD Pelvis WO    Narrative EXAMINATION:  CT RENAL STONE STUDY ABD PELVIS WO    CLINICAL HISTORY:  flank pain; Unspecified abdominal pain    TECHNIQUE:  Low dose axial images, sagittal and coronal reformations were obtained from the lung bases to the pubic symphysis.  Contrast was not administered.    COMPARISON:  CT renal stone study 05/07/2019    FINDINGS:  Imaged lung bases show minimal dependent atelectasis and few scattered linear opacities consistent with subsegmental scarring versus atelectasis.  Base of the heart is unchanged.    Small hiatal hernia and remote operative changes of prior gastric surgery similar to prior, without acute complication.    Gallbladder appears collapsed.  Noncontrast appearance of the liver, pancreas, spleen,  stomach, duodenum and bilateral adrenal glands are otherwise within normal limits.  No biliary ductal dilatation.    Bilateral kidneys are stable in size, shape and location.  Previous 13 mm calculus at the left renal pelvis near the UPJ, appears to have changed in location now within a left renal lower pole calyx.  There is mild dilatation of the pelvocaliceal system as well as mild stranding about the left renal pelvis and proximal ureter, to a slightly lesser degree when compared to 05/07/2019 CT.  Remainder of the left ureter is nondilated.  No radiodense calculus seen within the right collecting system, left ureter or urinary bladder.  No right hydronephrosis or significant perinephric stranding.  Right ureter is nondilated.  Urinary bladder is within normal limits.  Hysterectomy.  No adnexal mass seen.  No significant amount of free fluid seen within the pelvis.    No ascites, free air or lymphadenopathy.  No significant atherosclerosis.  The aorta is nonaneurysmal.    Small fat containing umbilical hernia.  Appendix and terminal ileum are within normal limits.  Scattered colonic diverticula without focal diverticulitis.  No evidence of bowel obstruction or inflammation.  No pneumatosis or portal venous gas.    Included osseous structures appear grossly stable including advanced degenerative changes at the left hip and lower lumbosacral spine with grade 1 anterolisthesis of L5 on S1, without acute displaced fracture-dislocation seen.      Impression 1. Previous obstructing 13 mm calculus at the left UPJ appears to have changed in position now within a lower pole calyx.  Slight interval decreased amount of left-sided hydronephrosis, now mild, as well as slight interval decrease left perinephric and proximal periureteral fat stranding which is presumably related to previous obstructive uropathy with superimposed pyeloureteronephritis not excluded.  2. Diverticulosis coli without diverticulitis.  3. Postsurgical  changes of sleeve gastrectomy and hysterectomy without acute complication.  4. Grossly stable few additional findings as above.      Electronically signed by: Marlo Knapp MD  Date:    09/26/2019  Time:    15:03          Assessment:     1. Nephrolithiasis        Plan:   1. Likely experiencing intermittent obstruction of UPJ from large left renal stone.  2. Recommend treatment for large renal stone with ureteroscopy to prevent further acute episodes.  3. Full risks and benefits of surgery discussed in detail, including but not limited to bleeding, infection, damage to bladder or kidney, need for further procedures, and complications related to anesthesia.  Informed consent signed and placed in chart.   4. Will schedule URS for 11/26

## 2019-10-11 NOTE — LETTER
October 11, 2019      Marlo Pandey,   [address]  [place] [address]           [place] [facility]  [address]  [facility] [address]  Phone: [phone]          Patient: Lelia Mendoza   MR Number: [id number]   YOB: 1965   Date of Visit: 10/11/2019       Dear Dr. Marlo Pandey:    Thank you for referring Lelia Mendoza to me for evaluation. Attached you will find relevant portions of my assessment and plan of care.    If you have questions, please do not hesitate to call me. I look forward to following Lelia Mendoza along with you.    Sincerely,    Shyam Dia MD    Enclosure  CC:  No Recipients    If you would like to receive this communication electronically, please contact [email or web address] or [phone] to request more information on [email or web address] Link access.    For providers and/or their staff who would like to refer a patient to [facility], please contact us through our one-stop-shop provider referral line, [facility], at [phone].    If you feel you have received this communication in error or would no longer like to receive these types of communications, please e-mail [email or web address]

## 2019-10-13 LAB — BACTERIA UR CULT: NO GROWTH

## 2019-11-12 ENCOUNTER — HOSPITAL ENCOUNTER (OUTPATIENT)
Dept: PREADMISSION TESTING | Facility: OTHER | Age: 54
Discharge: HOME | End: 2019-11-12
Attending: UROLOGY
Payer: COMMERCIAL

## 2019-11-12 VITALS
SYSTOLIC BLOOD PRESSURE: 185 MMHG | TEMPERATURE: 98 F | DIASTOLIC BLOOD PRESSURE: 101 MMHG | BODY MASS INDEX: 45.16 KG/M2 | OXYGEN SATURATION: 97 % | HEIGHT: 60 IN | WEIGHT: 230 LBS | HEART RATE: 57 BPM

## 2019-11-12 RX ORDER — SODIUM CHLORIDE, SODIUM LACTATE, POTASSIUM CHLORIDE, CALCIUM CHLORIDE 600; 310; 30; 20 MG/100ML; MG/100ML; MG/100ML; MG/100ML
INJECTION, SOLUTION INTRAVENOUS CONTINUOUS
Status: CANCELLED | OUTPATIENT
Start: 2019-11-12

## 2019-11-12 NOTE — DISCHARGE INSTRUCTIONS
PRE-ADMIT TESTING -  [phone]    [address]  [place] [facility]          Your surgery has been scheduled at [facility]. We are pleased to have the opportunity to serve you. For Further Information please call [phone].    On the day of surgery please report to the Information Desk on the 1st floor.    · CONTACT YOUR PHYSICIAN'S OFFICE THE DAY PRIOR TO YOUR SURGERY TO OBTAIN YOUR ARRIVAL TIME.     · The evening before surgery do not eat anything after 9 p.m. ( this includes hard candy, chewing gum and mints).  You may only have GATORADE, POWERADE AND WATER  from 9 p.m. until you leave your home.   DO NOT DRINK ANY LIQUIDS ON THE WAY TO THE HOSPITAL.      SPECIAL MEDICATION INSTRUCTIONS: TAKE medications checked off by the Anesthesiologist on your Medication List.    Angiogram Patients: Take medications as instructed by your physician, including aspirin.     Surgery Patients:    If you take ASPIRIN - Your PHYSICIAN/SURGEON will need to inform you IF/OR when you need to stop taking aspirin prior to your surgery.     Do Not take any medications containing IBUPROFEN.  Do Not Wear any make-up or dark nail polish   (especially eye make-up) to surgery. If you come to surgery with makeup on you will be required to remove the makeup or nail polish.    Do not shave your surgical area at least 5 days prior to your surgery. The surgical prep will be performed at the hospital according to Infection Control regulations.    Leave all valuables at home.   Do Not wear any jewelry or watches, including any metal in body piercings. Jewelry must be removed prior to coming to the hospital.  There is a possibility that rings that are unable to be removed may be cut off if they are on the surgical extremity.    Contact Lens must be removed before surgery. Either do not wear the contact lens or bring a case and solution for storage.  Please bring a container for eyeglasses or dentures as required.  Bring  any paperwork your physician has provided, such as consent forms,  history and physicals, doctor's orders, etc.   Bring comfortable clothes that are loose fitting to wear upon discharge. Take into consideration the type of surgery being performed.  Maintain your diet as advised per your physician the day prior to surgery.      Adequate rest the night before surgery is advised.   Park in the Parking lot behind the hospital or in the [place] [facility] across the street from the parking lot. Parking is complimentary.  If you will be discharged the same day as your procedure, please arrange for a responsible adult to drive you home or to accompany you if traveling by taxi.   YOU WILL NOT BE PERMITTED TO DRIVE OR TO LEAVE THE HOSPITAL ALONE AFTER SURGERY.   It is strongly recommended that you arrange for someone to remain with you for the first 24 hrs following your surgery.    The Surgeon will speak to your family/visitor after your surgery regarding the outcome of your surgery and post op care.  The Surgeon may speak to you after your surgery, but there is a possibility you may not remember the details.  Please check with your family members regarding the conversation with the Surgeon.    We strongly recommend whoever is bringing you home be present for discharge instructions.  This will ensure a thorough understanding for your post op home care.    EACH PATIENT IS ALLOWED TWO FAMILY MEMBERS OR VISITORS IN THE ROOM AND IN THE WAITING ROOMS WHILE YOU ARE IN SURGERY. ALL CHILDREN MUST ALWAYS BE ACCOMPANIED BY AN ADULT.    Thank you for your cooperation.  The Staff of [facility].                Bathing Instructions with Hibiclens     Shower the evening before and morning of your procedure with Hibiclens:   Wash your face with water and your regular face wash/soap   Apply Hibiclens directly on your skin or on a wet washcloth and wash gently. When showering: Move away from the shower stream when  applying Hibiclens to avoid rinsing off too soon.   Rinse thoroughly with warm water   Do not dilute Hibiclens         Dry off as usual, do not use any deodorant, powder, body lotions, perfume, after shave or cologne.

## 2019-11-19 ENCOUNTER — TELEPHONE (OUTPATIENT)
Dept: UROLOGY | Facility: CLINIC | Age: 54
End: 2019-11-19

## 2019-11-20 NOTE — TELEPHONE ENCOUNTER
Called patient no answer , work number no message left, last number no answer. Patient has not confirmed with the surgery scheduler that she wants to cancel.

## 2020-02-12 ENCOUNTER — OFFICE VISIT (OUTPATIENT)
Dept: INTERNAL MEDICINE | Facility: CLINIC | Age: 55
End: 2020-02-12
Payer: COMMERCIAL

## 2020-02-12 DIAGNOSIS — Z02.89 ENCOUNTER FOR COMPLETION OF FORM WITH PATIENT: Primary | ICD-10-CM

## 2020-02-12 PROCEDURE — 99499 UNLISTED E&M SERVICE: CPT | Mod: S$GLB,,, | Performed by: INTERNAL MEDICINE

## 2020-02-12 PROCEDURE — 3079F PR MOST RECENT DIASTOLIC BLOOD PRESSURE 80-89 MM HG: ICD-10-PCS | Mod: CPTII,S$GLB,, | Performed by: INTERNAL MEDICINE

## 2020-02-12 PROCEDURE — 99999 PR PBB SHADOW E&M-EST. PATIENT-LVL III: ICD-10-PCS | Mod: PBBFAC,,, | Performed by: INTERNAL MEDICINE

## 2020-02-12 PROCEDURE — 99499 NO LOS: ICD-10-PCS | Mod: S$GLB,,, | Performed by: INTERNAL MEDICINE

## 2020-02-12 PROCEDURE — 3075F PR MOST RECENT SYSTOLIC BLOOD PRESS GE 130-139MM HG: ICD-10-PCS | Mod: CPTII,S$GLB,, | Performed by: INTERNAL MEDICINE

## 2020-02-12 PROCEDURE — 3075F SYST BP GE 130 - 139MM HG: CPT | Mod: CPTII,S$GLB,, | Performed by: INTERNAL MEDICINE

## 2020-02-12 PROCEDURE — 3008F BODY MASS INDEX DOCD: CPT | Mod: CPTII,S$GLB,, | Performed by: INTERNAL MEDICINE

## 2020-02-12 PROCEDURE — 99999 PR PBB SHADOW E&M-EST. PATIENT-LVL III: CPT | Mod: PBBFAC,,, | Performed by: INTERNAL MEDICINE

## 2020-02-12 PROCEDURE — 3008F PR BODY MASS INDEX (BMI) DOCUMENTED: ICD-10-PCS | Mod: CPTII,S$GLB,, | Performed by: INTERNAL MEDICINE

## 2020-02-12 PROCEDURE — 3079F DIAST BP 80-89 MM HG: CPT | Mod: CPTII,S$GLB,, | Performed by: INTERNAL MEDICINE

## 2020-02-12 RX ORDER — PHENTERMINE HYDROCHLORIDE 37.5 MG/1
37.5 TABLET ORAL DAILY
COMMUNITY
Start: 2019-12-18 | End: 2020-06-08

## 2020-02-15 VITALS
HEIGHT: 60 IN | SYSTOLIC BLOOD PRESSURE: 138 MMHG | HEART RATE: 78 BPM | BODY MASS INDEX: 45.49 KG/M2 | TEMPERATURE: 99 F | RESPIRATION RATE: 18 BRPM | WEIGHT: 231.69 LBS | DIASTOLIC BLOOD PRESSURE: 88 MMHG

## 2020-02-15 NOTE — PROGRESS NOTES
Subjective:       Patient ID: Lelia Mendoza is a 54 y.o. female.    Chief Complaint: FMLA paperwork    HPI   Pt here to have her FMLA paperwork updated which she uses to take care of her mother who has chronic illness(advanced COPD).   Review of Systems   Constitutional: Negative for activity change, appetite change, chills, diaphoresis, fatigue, fever and unexpected weight change.   HENT: Negative for postnasal drip, rhinorrhea, sinus pressure, sneezing, sore throat, trouble swallowing and voice change.    Respiratory: Negative for cough, shortness of breath and wheezing.    Cardiovascular: Negative for chest pain, palpitations and leg swelling.   Gastrointestinal: Negative for abdominal pain, blood in stool, constipation, diarrhea, nausea and vomiting.   Genitourinary: Negative for dysuria.   Musculoskeletal: Negative for arthralgias and myalgias.   Skin: Negative for rash and wound.   Allergic/Immunologic: Negative for environmental allergies and food allergies.   Hematological: Negative for adenopathy. Does not bruise/bleed easily.       Objective:      Physical Exam   Constitutional: She is oriented to person, place, and time. She appears well-developed and well-nourished. No distress.   HENT:   Head: Normocephalic and atraumatic.   Eyes: Pupils are equal, round, and reactive to light. Conjunctivae and EOM are normal. Right eye exhibits no discharge. Left eye exhibits no discharge. No scleral icterus.   Neck: Neck supple. No JVD present.   Cardiovascular: Normal rate, regular rhythm, normal heart sounds and intact distal pulses.   Pulmonary/Chest: Effort normal and breath sounds normal. No respiratory distress. She has no wheezes. She has no rales.   Musculoskeletal: She exhibits no edema.   Lymphadenopathy:     She has no cervical adenopathy.   Neurological: She is alert and oriented to person, place, and time.   Skin: Skin is warm and dry. No rash noted. She is not diaphoretic. No pallor.       Assessment:        1. Encounter for completion of form with patient        Plan:    1. FMLA paperwork completed

## 2020-03-27 ENCOUNTER — OFFICE VISIT (OUTPATIENT)
Dept: URGENT CARE | Facility: CLINIC | Age: 55
End: 2020-03-27
Payer: COMMERCIAL

## 2020-03-27 VITALS
HEIGHT: 60 IN | DIASTOLIC BLOOD PRESSURE: 70 MMHG | OXYGEN SATURATION: 96 % | HEART RATE: 91 BPM | SYSTOLIC BLOOD PRESSURE: 105 MMHG | BODY MASS INDEX: 45.35 KG/M2 | WEIGHT: 231 LBS | TEMPERATURE: 102 F | RESPIRATION RATE: 19 BRPM

## 2020-03-27 DIAGNOSIS — J18.9 PNEUMONIA OF BOTH LUNGS DUE TO INFECTIOUS ORGANISM, UNSPECIFIED PART OF LUNG: Primary | ICD-10-CM

## 2020-03-27 DIAGNOSIS — R06.02 SHORTNESS OF BREATH: ICD-10-CM

## 2020-03-27 DIAGNOSIS — Z20.822 SUSPECTED COVID-19 VIRUS INFECTION: ICD-10-CM

## 2020-03-27 DIAGNOSIS — R50.9 FEVER, UNSPECIFIED FEVER CAUSE: ICD-10-CM

## 2020-03-27 LAB
CTP QC/QA: YES
FLUAV AG NPH QL: NEGATIVE
FLUBV AG NPH QL: NEGATIVE

## 2020-03-27 PROCEDURE — 96372 PR INJECTION,THERAP/PROPH/DIAG2ST, IM OR SUBCUT: ICD-10-PCS | Mod: S$GLB,,, | Performed by: NURSE PRACTITIONER

## 2020-03-27 PROCEDURE — 99214 OFFICE O/P EST MOD 30 MIN: CPT | Mod: 25,S$GLB,, | Performed by: NURSE PRACTITIONER

## 2020-03-27 PROCEDURE — 87804 POCT INFLUENZA A/B: ICD-10-PCS | Mod: QW,S$GLB,, | Performed by: NURSE PRACTITIONER

## 2020-03-27 PROCEDURE — 71046 X-RAY EXAM CHEST 2 VIEWS: CPT | Mod: S$GLB,,, | Performed by: RADIOLOGY

## 2020-03-27 PROCEDURE — 71046 XR CHEST PA AND LATERAL: ICD-10-PCS | Mod: S$GLB,,, | Performed by: RADIOLOGY

## 2020-03-27 PROCEDURE — 96372 THER/PROPH/DIAG INJ SC/IM: CPT | Mod: S$GLB,,, | Performed by: NURSE PRACTITIONER

## 2020-03-27 PROCEDURE — 87804 INFLUENZA ASSAY W/OPTIC: CPT | Mod: QW,S$GLB,, | Performed by: NURSE PRACTITIONER

## 2020-03-27 PROCEDURE — 99214 PR OFFICE/OUTPT VISIT, EST, LEVL IV, 30-39 MIN: ICD-10-PCS | Mod: 25,S$GLB,, | Performed by: NURSE PRACTITIONER

## 2020-03-27 RX ORDER — ACETAMINOPHEN 500 MG
1000 TABLET ORAL
Status: COMPLETED | OUTPATIENT
Start: 2020-03-27 | End: 2020-03-27

## 2020-03-27 RX ORDER — CEFTRIAXONE 1 G/1
1 INJECTION, POWDER, FOR SOLUTION INTRAMUSCULAR; INTRAVENOUS
Status: COMPLETED | OUTPATIENT
Start: 2020-03-27 | End: 2020-03-27

## 2020-03-27 RX ORDER — AZITHROMYCIN 250 MG/1
TABLET, FILM COATED ORAL
Qty: 6 TABLET | Refills: 0 | Status: SHIPPED | OUTPATIENT
Start: 2020-03-27 | End: 2020-06-08

## 2020-03-27 RX ORDER — ALBUTEROL SULFATE 90 UG/1
2 AEROSOL, METERED RESPIRATORY (INHALATION) EVERY 6 HOURS PRN
Qty: 8 G | Refills: 0 | Status: SHIPPED | OUTPATIENT
Start: 2020-03-27 | End: 2021-08-16

## 2020-03-27 RX ADMIN — Medication 1000 MG: at 11:03

## 2020-03-27 RX ADMIN — CEFTRIAXONE 1 G: 1 INJECTION, POWDER, FOR SOLUTION INTRAMUSCULAR; INTRAVENOUS at 11:03

## 2020-03-27 NOTE — PROGRESS NOTES
Subjective:       Patient ID: Lelia Mendoza is a 54 y.o. female.    Vitals:  height is 5' (1.524 m) and weight is 104.8 kg (231 lb). Her oral temperature is 101.9 °F (38.8 °C) (abnormal). Her blood pressure is 105/70 and her pulse is 91. Her respiration is 19 and oxygen saturation is 96%.     Chief Complaint: Weakness and Shortness of Breath    Patient reports fatigue with intermittent shortness of breath x 7 days.  She works as a  at the [employer] but has not worked in a week.  Mom was sent to the ER today for shortness of breath with COVID 19 suspicion.  Denies a history of risk factors of immunosuppression, heart or lung problems.  Denies recent travel.  Denies cough.  She has not taken anything for fever.      Shortness of Breath   This is a new problem. Episode onset: 1 week. The problem occurs intermittently. The problem has been gradually worsening. Associated symptoms include a fever. Pertinent negatives include no abdominal pain, chest pain, claudication, coryza, ear pain, headaches, hemoptysis, leg pain, leg swelling, neck pain, orthopnea, PND, rash, rhinorrhea, sore throat, sputum production, swollen glands, syncope, vomiting or wheezing. Nothing aggravates the symptoms. She has tried nothing for the symptoms. There is no history of allergies, aspirin allergies, asthma, bronchiolitis, CAD, chronic lung disease, COPD, DVT, a heart failure, PE, pneumonia or a recent surgery.       Constitution: Positive for fever and generalized weakness. Negative for chills, sweating and fatigue.   HENT: Negative for ear pain, congestion, sinus pain, sinus pressure, sore throat and voice change.         No taste no smell   Neck: Negative for neck pain and painful lymph nodes.   Cardiovascular: Negative for chest pain, leg swelling and passing out.   Eyes: Negative for eye redness.   Respiratory: Positive for shortness of breath. Negative for chest tightness, cough, sputum production, bloody sputum, COPD,  stridor, wheezing and asthma.    Gastrointestinal: Negative for abdominal pain, nausea and vomiting.   Musculoskeletal: Negative for muscle ache.   Skin: Negative for rash.   Allergic/Immunologic: Negative for seasonal allergies and asthma.   Neurological: Negative for headaches.   Hematologic/Lymphatic: Negative for swollen lymph nodes.       Objective:      Physical Exam   Constitutional: She is oriented to person, place, and time. She appears well-developed and well-nourished. She is cooperative.  Non-toxic appearance. She does not have a sickly appearance. She does not appear ill. No distress.   HENT:   Head: Normocephalic and atraumatic.   Right Ear: Hearing, tympanic membrane, external ear and ear canal normal.   Left Ear: Hearing, tympanic membrane, external ear and ear canal normal.   Nose: Nose normal. No mucosal edema, rhinorrhea or nasal deformity. No epistaxis. Right sinus exhibits no maxillary sinus tenderness and no frontal sinus tenderness. Left sinus exhibits no maxillary sinus tenderness and no frontal sinus tenderness.   Mouth/Throat: Uvula is midline, oropharynx is clear and moist and mucous membranes are normal. No trismus in the jaw. Normal dentition. No uvula swelling. No oropharyngeal exudate, posterior oropharyngeal edema or posterior oropharyngeal erythema.   Eyes: Conjunctivae and lids are normal. No scleral icterus.   Neck: Trachea normal, full passive range of motion without pain and phonation normal. Neck supple. No neck rigidity. No edema and no erythema present.   Cardiovascular: Normal rate, regular rhythm, normal heart sounds, intact distal pulses and normal pulses.   Pulmonary/Chest: Effort normal and breath sounds normal. No respiratory distress. She has no decreased breath sounds. She has no wheezes. She has no rhonchi.   Talking with clear speech and in full sentences.     Abdominal: Normal appearance.   Musculoskeletal: Normal range of motion. She exhibits no edema or deformity.    Neurological: She is alert and oriented to person, place, and time. She exhibits normal muscle tone. Coordination normal.   Skin: Skin is warm, dry, intact, not diaphoretic and not pale.   Psychiatric: She has a normal mood and affect. Her speech is normal and behavior is normal. Judgment and thought content normal. Cognition and memory are normal.   Nursing note and vitals reviewed.    Results for orders placed or performed in visit on 03/27/20   POCT Influenza A/B   Result Value Ref Range    Rapid Influenza A Ag Negative Negative    Rapid Influenza B Ag Negative Negative     Acceptable Yes      X-ray Chest Pa And Lateral    Result Date: 3/27/2020  EXAMINATION: XR CHEST PA AND LATERAL CLINICAL HISTORY: Fever, unspecified TECHNIQUE: PA and lateral views of the chest were performed. COMPARISON: Chest radiograph 09/18/2015 FINDINGS: Cardiomediastinal silhouette is within normal limits. Right greater than left bibasilar consolidative airspace opacities.  No overt interstitial edema, sizable pleural effusion or pneumothorax. Multilevel degenerative changes of the imaged spine.     1. Right greater than left bibasilar consolidative airspace opacities concerning for infection. This report was flagged in Epic as abnormal. Electronically signed by: Cody Sevilla Date:    03/27/2020 Time:    11:12      Assessment:       1. Pneumonia of both lungs due to infectious organism, unspecified part of lung    2. Fever, unspecified fever cause    3. Shortness of breath    4. Suspected Covid-19 Virus Infection        Plan:       Flu negative.  CXR shows bilateral pneumonia, Right greater than left.    Patient does not meet criteria for testing.  She is speaking in clear sentences.  Pulse ox is 95% on room air.  Originally documented as 94%, rechecked by myself and was 95% with no activity.  Upon walking up and down jimenes with no increasing shortness of breath and still being able to speak in full sentences her pulse ox  actually improved to 96% on room air.    Pneumonia of both lungs due to infectious organism, unspecified part of lung  -     COVID-19 Home Symptom Monitoring  - Duration (days): 14  -     cefTRIAXone injection 1 g  -     azithromycin (ZITHROMAX Z-ANTHONY) 250 MG tablet; Take 2 tablets (500 mg) on  Day 1,  followed by 1 tablet (250 mg) once daily on Days 2 through 5.  Dispense: 6 tablet; Refill: 0  -     albuterol (PROVENTIL/VENTOLIN HFA) 90 mcg/actuation inhaler; Inhale 2 puffs into the lungs every 6 (six) hours as needed for Wheezing or Shortness of Breath. Rescue  Dispense: 8 g; Refill: 0    Fever, unspecified fever cause  -     acetaminophen tablet 1,000 mg  -     X-Ray Chest PA And Lateral; Future; Expected date: 03/27/2020  -     POCT Influenza A/B  -     COVID-19 Home Symptom Monitoring  - Duration (days): 14  -     cefTRIAXone injection 1 g  -     azithromycin (ZITHROMAX Z-ANTHONY) 250 MG tablet; Take 2 tablets (500 mg) on  Day 1,  followed by 1 tablet (250 mg) once daily on Days 2 through 5.  Dispense: 6 tablet; Refill: 0  -     albuterol (PROVENTIL/VENTOLIN HFA) 90 mcg/actuation inhaler; Inhale 2 puffs into the lungs every 6 (six) hours as needed for Wheezing or Shortness of Breath. Rescue  Dispense: 8 g; Refill: 0    Shortness of breath  -     X-Ray Chest PA And Lateral; Future; Expected date: 03/27/2020  -     POCT Influenza A/B  -     COVID-19 Home Symptom Monitoring  - Duration (days): 14  -     cefTRIAXone injection 1 g  -     azithromycin (ZITHROMAX Z-ANTHONY) 250 MG tablet; Take 2 tablets (500 mg) on  Day 1,  followed by 1 tablet (250 mg) once daily on Days 2 through 5.  Dispense: 6 tablet; Refill: 0  -     albuterol (PROVENTIL/VENTOLIN HFA) 90 mcg/actuation inhaler; Inhale 2 puffs into the lungs every 6 (six) hours as needed for Wheezing or Shortness of Breath. Rescue  Dispense: 8 g; Refill: 0    Suspected Covid-19 Virus Infection  -     COVID-19 Home Symptom Monitoring  - Duration (days): 14  -     cefTRIAXone  injection 1 g  -     azithromycin (ZITHROMAX Z-ANTHONY) 250 MG tablet; Take 2 tablets (500 mg) on  Day 1,  followed by 1 tablet (250 mg) once daily on Days 2 through 5.  Dispense: 6 tablet; Refill: 0  -     albuterol (PROVENTIL/VENTOLIN HFA) 90 mcg/actuation inhaler; Inhale 2 puffs into the lungs every 6 (six) hours as needed for Wheezing or Shortness of Breath. Rescue  Dispense: 8 g; Refill: 0      Patient Instructions   As discussed, you do not meet criteria for COVID 19 testing in this clinical setting, I however am still suspicious of this virus.  We will cover you for a bacterial pneumonia.  Antibiotic as directed.  Proair inhaler as needed for shortness of breath or wheezing.  Rest.  Fluids.  Okay to take tylenol or ibuprofen otc as needed for fever, take as directed.  Follow up with your PCP.  Go to the ER for any worsening shortness of breath immediately!  You were enrolled in Covid home monitoring program and they will start texting you daily to check on you.    [place] [facility]  Preventing the Spread of Coronavirus Disease 2019 in Homes and Residential Communities      Prevention steps for people with confirmed or suspected COVID-19 (including persons under investigation) who do not need to be hospitalized and people with confirmed COVID-19 who were hospitalized and determined to be medically stable to go home.    Your healthcare provider and public health staff will evaluate whether you can be cared for at home.   Stay home except to get medical care.   Separate yourself from other people and animals in your home   Call ahead before visiting your doctor.   Wear a facemask.   Cover your coughs and sneezes.   Clean your hands often.   Avoid sharing personal household items.   Clean all high-touch surfaces every day.   Monitor your symptoms. Seek prompt medical attention if your illness is worsening (e.g., difficulty breathing). Before seeking care, call your healthcare  provider.   If you have a medical emergency and need to call 911, notify the dispatch personnel that you have, or are being evaluated for COVID-19. If possible, put on a facemask before emergency medical services arrive.   Discontinuing home isolation. Call your provider about guidance to discontinue home isolation.    Recommended precautions for household members, intimate partners, and caregivers in a nonhealthcare setting of a patient with symptomatic laboratory-confirmed COVID-19 or a patient under investigation.  Household members, intimate partners, and caregivers in a nonhealthcare setting may have close contact with a person with symptomatic, laboratory-confirmed COVID-19 or a person under investigation. Close contacts should monitor their health; they should call their healthcare provider right away if they develop symptoms suggestive of COVID-19 (e.g., fever, cough, shortness of breath).    Close contacts should also follow these recommendations:   Make sure that you understand and can help the patient follow their healthcare provider's instructions for medication(s) and care. You should help the patient with basic needs in the home and provide support for getting groceries, prescriptions, and other personal needs.   Monitor the patient's symptoms. If the patient is getting sicker, call his or her healthcare provider and tell them that the patient has laboratory-confirmed COVID-19. This will help the healthcare provider's office take steps to keep other people in the office or waiting room from getting infected. Ask the healthcare provider to call the local or state health department for additional guidance. If the patient has a medical emergency and you need to call 911, notify the dispatch personnel that the patient has, or is being evaluated for COVID-19.   Household members should stay in another room or be  from the patient as much as possible. Household members should use a separate  bedroom and bathroom, if available.   Prohibit visitors who do not have an essential need to be in the home.   Household members should care for any pets in the home. Do not handle pets or other animals while sick.   Make sure that shared spaces in the home have good air flow, such as by an air conditioner or an opened window, weather permitting.   Perform hand hygiene frequently. Wash your hands often with soap and water for at least 20 seconds or use an alcohol-based hand  that contains 60 to 95% alcohol, covering all surfaces of your hands and rubbing them together until they feel dry. Soap and water should be used preferentially if hands are visibly dirty.   Avoid touching your eyes, nose, and mouth with unwashed hands.   The patient should wear a facemask when you are around other people. If the patient is not able to wear a facemask (for example, because it causes trouble breathing), you, as the caregiver should wear a mask when you are in the same room as the patient.   Wear a disposable facemask and gloves when you touch or have contact with the patient's blood, stool, or body fluids, such as saliva, sputum, nasal mucus, vomit, urine.  o Throw out disposable facemasks and gloves after using them. Do not reuse.  o When removing personal protective equipment, first remove and dispose of gloves. Then, immediately clean your hands with soap and water or alcohol-based hand . Next, remove and dispose of facemask, and immediately clean your hands again with soap and water or alcohol-based hand .   Avoid sharing household items with the patient. You should not share dishes, drinking glasses, cups, eating utensils, towels, bedding, or other items. After the patient uses these items, you should wash them thoroughly (see below Wash laundry thoroughly).   Clean all high-touch surfaces, such as counters, tabletops, doorknobs, bathroom fixtures, toilets, phones, keyboards, tablets,  and bedside tables, every day. Also, clean any surfaces that may have blood, stool, or body fluids on them.   Use a household cleaning spray or wipe, according to the label instructions. Labels contain instructions for safe and effective use of the cleaning product including precautions you should take when applying the product, such as wearing gloves and making sure you have good ventilation during use of the product.   Wash laundry thoroughly.  o Immediately remove and wash clothes or bedding that have blood, stool, or body fluids on them.  o Wear disposable gloves while handling soiled items and keep soiled items away from your body. Clean your hands (with soap and water or an alcohol-based hand ) immediately after removing your gloves.  o Read and follow directions on labels of laundry or clothing items and detergent. In general, using a normal laundry detergent according to washing machine instructions and dry thoroughly using the warmest temperatures recommended on the clothing label.   Place all used disposable gloves, facemasks, and other contaminated items in a lined container before disposing of them with other household waste. Clean your hands (with soap and water or an alcohol-based hand ) immediately after handling these items. Soap and water should be used preferentially if hands are visibly dirty.   Discuss any additional questions with your state or local health department or healthcare provider. Check available hours when contacting your local health department.    For more information see CDC link below.      https://www.cdc.gov/coronavirus/2019-ncov/hcp/guidance-prevent-spread.html#precautions                 Pneumonia (Adult)  Pneumonia is an infection deep within the lungs. It is in the small air sacs (alveoli). Pneumonia may be caused by a virus or bacteria. Pneumonia caused by bacteria is usually treated with an antibiotic. Severe cases may need to be treated in the  hospital. Milder cases can be treated at home. Symptoms usually start to get better during the first 2 days of treatment.    Home care  Follow these guidelines when caring for yourself at home:  · Rest at home for the first 2 to 3 days, or until you feel stronger. Dont let yourself get overly tired when you go back to your activities.  · Stay away from cigarette smoke - yours or other peoples.  · You may use acetaminophen or ibuprofen to control fever or pain, unless another medicine was prescribed. If you have chronic liver or kidney disease, talk with your healthcare provider before using these medicines. Also talk with your provider if youve had a stomach ulcer or gastrointestinal bleeding. Dont give aspirin to anyone younger than 18 years of age who is ill with a fever. It may cause severe liver damage.  · Your appetite may be poor, so a light diet is fine.  · Drink 6 to 8 glasses of fluids every day to make sure you are getting enough fluids. Beverages can include water, sport drinks, sodas without caffeine, juices, tea, or soup. Fluids will help loosen secretions in the lung. This will make it easier for you to cough up the phlegm (sputum). If you also have heart or kidney disease, check with your healthcare provider before you drink extra fluids.  · Take antibiotic medicine prescribed until it is all gone, even if you are feeling better after a few days.  Follow-up care  Follow up with your healthcare provider in the next 2 to 3 days, or as advised. This is to be sure the medicine is helping you get better.  If you are 65 or older, you should get a pneumococcal vaccine and a yearly flu (influenza) shot. You should also get these vaccines if you have chronic lung disease like asthma, emphysema, or COPD. Recently, a second type of pneumonia vaccine has become available for everyone over 65 years old. This is in addition to the previous vaccine. Ask your provider about this.  When to seek medical  advice  Call your healthcare provider right away if any of these occur:  · You dont get better within the first 48 hours of treatment  · Shortness of breath gets worse  · Rapid breathing (more than 25 breaths per minute)  · Coughing up blood  · Chest pain gets worse with breathing  · Fever of 100.4°F (38°C) or higher that doesnt get better with fever medicine  · Weakness, dizziness, or fainting that gets worse  · Thirst or dry mouth that gets worse  · Sinus pain, headache, or a stiff neck  · Chest pain not caused by coughing  Date Last Reviewed: 1/1/2017  © 3134-1439 [employer]. [address] [place] [address], [place] [address]. All rights reserved. This information is not intended as a substitute for professional medical care. Always follow your healthcare professional's instructions.

## 2020-03-27 NOTE — PATIENT INSTRUCTIONS
As discussed, you do not meet criteria for COVID 19 testing in this clinical setting, I however am still suspicious of this virus.  We will cover you for a bacterial pneumonia.  Antibiotic as directed.  Proair inhaler as needed for shortness of breath or wheezing.  Rest.  Fluids.  Okay to take tylenol or ibuprofen otc as needed for fever, take as directed.  Follow up with your PCP.  Go to the ER for any worsening shortness of breath immediately!  You were enrolled in Covid home monitoring program and they will start texting you daily to check on you.    [place] [facility] [place]  Preventing the Spread of Coronavirus Disease 2019 in Homes and Residential Communities      Prevention steps for people with confirmed or suspected COVID-19 (including persons under investigation) who do not need to be hospitalized and people with confirmed COVID-19 who were hospitalized and determined to be medically stable to go home.    Your healthcare provider and public health staff will evaluate whether you can be cared for at home.   Stay home except to get medical care.   Separate yourself from other people and animals in your home   Call ahead before visiting your doctor.   Wear a facemask.   Cover your coughs and sneezes.   Clean your hands often.   Avoid sharing personal household items.   Clean all high-touch surfaces every day.   Monitor your symptoms. Seek prompt medical attention if your illness is worsening (e.g., difficulty breathing). Before seeking care, call your healthcare provider.   If you have a medical emergency and need to call 911, notify the dispatch personnel that you have, or are being evaluated for COVID-19. If possible, put on a facemask before emergency medical services arrive.   Discontinuing home isolation. Call your provider about guidance to discontinue home isolation.    Recommended precautions for household members, intimate partners, and caregivers in a nonhealthcare  setting of a patient with symptomatic laboratory-confirmed COVID-19 or a patient under investigation.  Household members, intimate partners, and caregivers in a nonhealthcare setting may have close contact with a person with symptomatic, laboratory-confirmed COVID-19 or a person under investigation. Close contacts should monitor their health; they should call their healthcare provider right away if they develop symptoms suggestive of COVID-19 (e.g., fever, cough, shortness of breath).    Close contacts should also follow these recommendations:   Make sure that you understand and can help the patient follow their healthcare provider's instructions for medication(s) and care. You should help the patient with basic needs in the home and provide support for getting groceries, prescriptions, and other personal needs.   Monitor the patient's symptoms. If the patient is getting sicker, call his or her healthcare provider and tell them that the patient has laboratory-confirmed COVID-19. This will help the healthcare provider's office take steps to keep other people in the office or waiting room from getting infected. Ask the healthcare provider to call the local or [facility] health department for additional guidance. If the patient has a medical emergency and you need to call 911, notify the dispatch personnel that the patient has, or is being evaluated for COVID-19.   Household members should stay in another room or be  from the patient as much as possible. Household members should use a separate bedroom and bathroom, if available.   Prohibit visitors who do not have an essential need to be in the home.   Household members should care for any pets in the home. Do not handle pets or other animals while sick.   Make sure that shared spaces in the home have good air flow, such as by an air conditioner or an opened window, weather permitting.   Perform hand hygiene frequently. Wash your hands often with soap and  water for at least 20 seconds or use an alcohol-based hand  that contains 60 to 95% alcohol, covering all surfaces of your hands and rubbing them together until they feel dry. Soap and water should be used preferentially if hands are visibly dirty.   Avoid touching your eyes, nose, and mouth with unwashed hands.   The patient should wear a facemask when you are around other people. If the patient is not able to wear a facemask (for example, because it causes trouble breathing), you, as the caregiver should wear a mask when you are in the same room as the patient.   Wear a disposable facemask and gloves when you touch or have contact with the patient's blood, stool, or body fluids, such as saliva, sputum, nasal mucus, vomit, urine.  o Throw out disposable facemasks and gloves after using them. Do not reuse.  o When removing personal protective equipment, first remove and dispose of gloves. Then, immediately clean your hands with soap and water or alcohol-based hand . Next, remove and dispose of facemask, and immediately clean your hands again with soap and water or alcohol-based hand .   Avoid sharing household items with the patient. You should not share dishes, drinking glasses, cups, eating utensils, towels, bedding, or other items. After the patient uses these items, you should wash them thoroughly (see below Wash laundry thoroughly).   Clean all high-touch surfaces, such as counters, tabletops, doorknobs, bathroom fixtures, toilets, phones, keyboards, tablets, and bedside tables, every day. Also, clean any surfaces that may have blood, stool, or body fluids on them.   Use a household cleaning spray or wipe, according to the label instructions. Labels contain instructions for safe and effective use of the cleaning product including precautions you should take when applying the product, such as wearing gloves and making sure you have good ventilation during use of the  product.   Wash laundry thoroughly.  o Immediately remove and wash clothes or bedding that have blood, stool, or body fluids on them.  o Wear disposable gloves while handling soiled items and keep soiled items away from your body. Clean your hands (with soap and water or an alcohol-based hand ) immediately after removing your gloves.  o Read and follow directions on labels of laundry or clothing items and detergent. In general, using a normal laundry detergent according to washing machine instructions and dry thoroughly using the warmest temperatures recommended on the clothing label.   Place all used disposable gloves, facemasks, and other contaminated items in a lined container before disposing of them with other household waste. Clean your hands (with soap and water or an alcohol-based hand ) immediately after handling these items. Soap and water should be used preferentially if hands are visibly dirty.   Discuss any additional questions with your state or local health department or healthcare provider. Check available hours when contacting your local health department.    For more information see CDC link below.      https://www.cdc.gov/coronavirus/2019-ncov/hcp/guidance-prevent-spread.html#precautions                 Pneumonia (Adult)  Pneumonia is an infection deep within the lungs. It is in the small air sacs (alveoli). Pneumonia may be caused by a virus or bacteria. Pneumonia caused by bacteria is usually treated with an antibiotic. Severe cases may need to be treated in the hospital. Milder cases can be treated at home. Symptoms usually start to get better during the first 2 days of treatment.    Home care  Follow these guidelines when caring for yourself at home:  · Rest at home for the first 2 to 3 days, or until you feel stronger. Dont let yourself get overly tired when you go back to your activities.  · Stay away from cigarette smoke - yours or other peoples.  · You may use  acetaminophen or ibuprofen to control fever or pain, unless another medicine was prescribed. If you have chronic liver or kidney disease, talk with your healthcare provider before using these medicines. Also talk with your provider if youve had a stomach ulcer or gastrointestinal bleeding. Dont give aspirin to anyone younger than 18 years of age who is ill with a fever. It may cause severe liver damage.  · Your appetite may be poor, so a light diet is fine.  · Drink 6 to 8 glasses of fluids every day to make sure you are getting enough fluids. Beverages can include water, sport drinks, sodas without caffeine, juices, tea, or soup. Fluids will help loosen secretions in the lung. This will make it easier for you to cough up the phlegm (sputum). If you also have heart or kidney disease, check with your healthcare provider before you drink extra fluids.  · Take antibiotic medicine prescribed until it is all gone, even if you are feeling better after a few days.  Follow-up care  Follow up with your healthcare provider in the next 2 to 3 days, or as advised. This is to be sure the medicine is helping you get better.  If you are 65 or older, you should get a pneumococcal vaccine and a yearly flu (influenza) shot. You should also get these vaccines if you have chronic lung disease like asthma, emphysema, or COPD. Recently, a second type of pneumonia vaccine has become available for everyone over 65 years old. This is in addition to the previous vaccine. Ask your provider about this.  When to seek medical advice  Call your healthcare provider right away if any of these occur:  · You dont get better within the first 48 hours of treatment  · Shortness of breath gets worse  · Rapid breathing (more than 25 breaths per minute)  · Coughing up blood  · Chest pain gets worse with breathing  · Fever of 100.4°F (38°C) or higher that doesnt get better with fever medicine  · Weakness, dizziness, or fainting that gets worse  · Thirst  or dry mouth that gets worse  · Sinus pain, headache, or a stiff neck  · Chest pain not caused by coughing  Date Last Reviewed: 1/1/2017 © 2000-2017 [employer]. [address] [place] [address], [place] [address]. All rights reserved. This information is not intended as a substitute for professional medical care. Always follow your healthcare professional's instructions.

## 2020-03-29 ENCOUNTER — NURSE TRIAGE (OUTPATIENT)
Dept: ADMINISTRATIVE | Facility: CLINIC | Age: 55
End: 2020-03-29

## 2020-03-29 NOTE — TELEPHONE ENCOUNTER
3 attempts made to contact pt as part of the COVID-19 Symptom Tracker. 2 voice messages were left.     Reason for Disposition   Message left on unidentified voice mail. Phone number verified.    Protocols used: NO CONTACT OR DUPLICATE CONTACT CALL-A-OH

## 2020-04-20 ENCOUNTER — OFFICE VISIT (OUTPATIENT)
Dept: INTERNAL MEDICINE | Facility: CLINIC | Age: 55
End: 2020-04-20
Payer: COMMERCIAL

## 2020-04-20 DIAGNOSIS — I10 ESSENTIAL HYPERTENSION: ICD-10-CM

## 2020-04-20 DIAGNOSIS — I26.99 PULMONARY EMBOLISM, UNSPECIFIED CHRONICITY, UNSPECIFIED PULMONARY EMBOLISM TYPE, UNSPECIFIED WHETHER ACUTE COR PULMONALE PRESENT: ICD-10-CM

## 2020-04-20 DIAGNOSIS — J40 BRONCHITIS DUE TO COVID-19 VIRUS: Primary | ICD-10-CM

## 2020-04-20 DIAGNOSIS — U07.1 BRONCHITIS DUE TO COVID-19 VIRUS: Primary | ICD-10-CM

## 2020-04-20 PROCEDURE — 99214 PR OFFICE/OUTPT VISIT, EST, LEVL IV, 30-39 MIN: ICD-10-PCS | Mod: 95,,, | Performed by: INTERNAL MEDICINE

## 2020-04-20 PROCEDURE — 99214 OFFICE O/P EST MOD 30 MIN: CPT | Mod: 95,,, | Performed by: INTERNAL MEDICINE

## 2020-04-20 RX ORDER — AMLODIPINE BESYLATE 10 MG/1
10 TABLET ORAL DAILY
Qty: 90 TABLET | Refills: 3 | Status: SHIPPED | OUTPATIENT
Start: 2020-04-20 | End: 2021-03-30

## 2020-04-20 NOTE — PROGRESS NOTES
Subjective:       Patient ID: Lelia Mendoza is a 54 y.o. female.    Chief Complaint: No chief complaint on file.    HPI   The patient location is: [place], [place]  The chief complaint leading to consultation is: hospital f/u for CV 19/Pulmonary Embolism   Visit type: audiovisual  Total time spent with patient: 10 minutes  Each patient to whom he or she provides medical services by telemedicine is:  (1) informed of the relationship between the physician and patient and the respective role of any other health care provider with respect to management of the patient; and (2) notified that he or she may decline to receive medical services by telemedicine and may withdraw from such care at any time.    Pt here for f/u from  on 3/27/20 for PNA. She was then seen in the ED at [facility] on 4/3/20 and was diagnosed with CV 19. She was admitted until April 10. Pt did not require mechanical ventilation but she was found to have PE's so was d/c on Eliquis. Since discharge pt's cough has continued to improve and only has mild SOB with exertion. No fevers/chills. I do not have her medical records for review.     Review of Systems   Constitutional: Negative for activity change, appetite change, chills, diaphoresis, fatigue, fever and unexpected weight change.   HENT: Negative for congestion, mouth sores, postnasal drip, rhinorrhea, sinus pressure, sneezing, sore throat, trouble swallowing and voice change.    Eyes: Negative for pain, discharge and visual disturbance.   Respiratory: Positive for cough and shortness of breath. Negative for wheezing.    Cardiovascular: Negative for chest pain, palpitations and leg swelling.   Gastrointestinal: Negative for abdominal pain, blood in stool, constipation, diarrhea, nausea and vomiting.   Endocrine: Negative for cold intolerance and heat intolerance.   Genitourinary: Negative for difficulty urinating, dysuria, frequency, hematuria and urgency.   Musculoskeletal: Negative for arthralgias  and myalgias.   Skin: Negative for rash and wound.   Allergic/Immunologic: Negative for environmental allergies and food allergies.   Neurological: Negative for dizziness, tremors, seizures, syncope, weakness, light-headedness and headaches.   Hematological: Negative for adenopathy. Does not bruise/bleed easily.   Psychiatric/Behavioral: Negative for confusion and sleep disturbance. The patient is not nervous/anxious.        Objective:      Physical Exam   Constitutional: She is oriented to person, place, and time. She appears well-developed and well-nourished. No distress.   Pulmonary/Chest: No respiratory distress.   Neurological: She is alert and oriented to person, place, and time.   Skin: She is not diaphoretic.       Assessment:       1. Bronchitis due to COVID-19 virus    2. Pulmonary embolism, unspecified chronicity, unspecified pulmonary embolism type, unspecified whether acute cor pulmonale present    3. Essential hypertension        Plan:    1. Pt continued to improve clinically   2. Currently on Eliquis, suspect 2/2 # 1   3. Stable on Norvasc   4. F/u with me in the clinic in 3 months     ED precautions discussed with pt.

## 2020-05-15 DIAGNOSIS — Z12.39 BREAST CANCER SCREENING: ICD-10-CM

## 2020-06-08 ENCOUNTER — LAB VISIT (OUTPATIENT)
Dept: LAB | Facility: HOSPITAL | Age: 55
End: 2020-06-08
Attending: INTERNAL MEDICINE
Payer: COMMERCIAL

## 2020-06-08 ENCOUNTER — OFFICE VISIT (OUTPATIENT)
Dept: INTERNAL MEDICINE | Facility: CLINIC | Age: 55
End: 2020-06-08
Payer: COMMERCIAL

## 2020-06-08 ENCOUNTER — TELEPHONE (OUTPATIENT)
Dept: HEMATOLOGY/ONCOLOGY | Facility: CLINIC | Age: 55
End: 2020-06-08

## 2020-06-08 VITALS
BODY MASS INDEX: 42.73 KG/M2 | WEIGHT: 217.63 LBS | TEMPERATURE: 98 F | DIASTOLIC BLOOD PRESSURE: 80 MMHG | HEART RATE: 70 BPM | HEIGHT: 60 IN | RESPIRATION RATE: 16 BRPM | SYSTOLIC BLOOD PRESSURE: 106 MMHG

## 2020-06-08 DIAGNOSIS — E66.01 MORBID OBESITY WITH BMI OF 40.0-44.9, ADULT: ICD-10-CM

## 2020-06-08 DIAGNOSIS — I10 HTN, GOAL BELOW 130/80: ICD-10-CM

## 2020-06-08 DIAGNOSIS — I26.99 ACUTE PULMONARY EMBOLISM WITHOUT ACUTE COR PULMONALE, UNSPECIFIED PULMONARY EMBOLISM TYPE: Primary | ICD-10-CM

## 2020-06-08 LAB
ANION GAP SERPL CALC-SCNC: 8 MMOL/L (ref 8–16)
BASOPHILS # BLD AUTO: 0.02 K/UL (ref 0–0.2)
BASOPHILS NFR BLD: 0.4 % (ref 0–1.9)
BUN SERPL-MCNC: 19 MG/DL (ref 6–20)
CALCIUM SERPL-MCNC: 8.8 MG/DL (ref 8.7–10.5)
CHLORIDE SERPL-SCNC: 109 MMOL/L (ref 95–110)
CO2 SERPL-SCNC: 25 MMOL/L (ref 23–29)
CREAT SERPL-MCNC: 0.8 MG/DL (ref 0.5–1.4)
DIFFERENTIAL METHOD: ABNORMAL
EOSINOPHIL # BLD AUTO: 0.1 K/UL (ref 0–0.5)
EOSINOPHIL NFR BLD: 2.2 % (ref 0–8)
ERYTHROCYTE [DISTWIDTH] IN BLOOD BY AUTOMATED COUNT: 15.8 % (ref 11.5–14.5)
EST. GFR  (AFRICAN AMERICAN): >60 ML/MIN/1.73 M^2
EST. GFR  (NON AFRICAN AMERICAN): >60 ML/MIN/1.73 M^2
GLUCOSE SERPL-MCNC: 84 MG/DL (ref 70–110)
HCT VFR BLD AUTO: 40.4 % (ref 37–48.5)
HGB BLD-MCNC: 12.7 G/DL (ref 12–16)
IMM GRANULOCYTES # BLD AUTO: 0.01 K/UL (ref 0–0.04)
IMM GRANULOCYTES NFR BLD AUTO: 0.2 % (ref 0–0.5)
LYMPHOCYTES # BLD AUTO: 2.4 K/UL (ref 1–4.8)
LYMPHOCYTES NFR BLD: 47.8 % (ref 18–48)
MCH RBC QN AUTO: 29.7 PG (ref 27–31)
MCHC RBC AUTO-ENTMCNC: 31.4 G/DL (ref 32–36)
MCV RBC AUTO: 94 FL (ref 82–98)
MONOCYTES # BLD AUTO: 0.4 K/UL (ref 0.3–1)
MONOCYTES NFR BLD: 7.3 % (ref 4–15)
NEUTROPHILS # BLD AUTO: 2.1 K/UL (ref 1.8–7.7)
NEUTROPHILS NFR BLD: 42.1 % (ref 38–73)
NRBC BLD-RTO: 0 /100 WBC
PLATELET # BLD AUTO: 279 K/UL (ref 150–350)
PMV BLD AUTO: 10.7 FL (ref 9.2–12.9)
POTASSIUM SERPL-SCNC: 4 MMOL/L (ref 3.5–5.1)
RBC # BLD AUTO: 4.28 M/UL (ref 4–5.4)
SODIUM SERPL-SCNC: 142 MMOL/L (ref 136–145)
WBC # BLD AUTO: 4.96 K/UL (ref 3.9–12.7)

## 2020-06-08 PROCEDURE — 3074F PR MOST RECENT SYSTOLIC BLOOD PRESSURE < 130 MM HG: ICD-10-PCS | Mod: CPTII,S$GLB,, | Performed by: INTERNAL MEDICINE

## 2020-06-08 PROCEDURE — 99214 PR OFFICE/OUTPT VISIT, EST, LEVL IV, 30-39 MIN: ICD-10-PCS | Mod: S$GLB,,, | Performed by: INTERNAL MEDICINE

## 2020-06-08 PROCEDURE — 36415 COLL VENOUS BLD VENIPUNCTURE: CPT | Mod: PO

## 2020-06-08 PROCEDURE — 80048 BASIC METABOLIC PNL TOTAL CA: CPT

## 2020-06-08 PROCEDURE — 3079F DIAST BP 80-89 MM HG: CPT | Mod: CPTII,S$GLB,, | Performed by: INTERNAL MEDICINE

## 2020-06-08 PROCEDURE — 99999 PR PBB SHADOW E&M-EST. PATIENT-LVL III: CPT | Mod: PBBFAC,,, | Performed by: INTERNAL MEDICINE

## 2020-06-08 PROCEDURE — 99214 OFFICE O/P EST MOD 30 MIN: CPT | Mod: S$GLB,,, | Performed by: INTERNAL MEDICINE

## 2020-06-08 PROCEDURE — 3008F PR BODY MASS INDEX (BMI) DOCUMENTED: ICD-10-PCS | Mod: CPTII,S$GLB,, | Performed by: INTERNAL MEDICINE

## 2020-06-08 PROCEDURE — 3008F BODY MASS INDEX DOCD: CPT | Mod: CPTII,S$GLB,, | Performed by: INTERNAL MEDICINE

## 2020-06-08 PROCEDURE — 3074F SYST BP LT 130 MM HG: CPT | Mod: CPTII,S$GLB,, | Performed by: INTERNAL MEDICINE

## 2020-06-08 PROCEDURE — 85025 COMPLETE CBC W/AUTO DIFF WBC: CPT

## 2020-06-08 PROCEDURE — 99999 PR PBB SHADOW E&M-EST. PATIENT-LVL III: ICD-10-PCS | Mod: PBBFAC,,, | Performed by: INTERNAL MEDICINE

## 2020-06-08 PROCEDURE — 3079F PR MOST RECENT DIASTOLIC BLOOD PRESSURE 80-89 MM HG: ICD-10-PCS | Mod: CPTII,S$GLB,, | Performed by: INTERNAL MEDICINE

## 2020-06-08 NOTE — PROGRESS NOTES
Subjective:       Patient ID: Lelia Mendoza is a 54 y.o. female.    Chief Complaint: Follow-up    HPI   Pt with recent PE suspected to be from CV 19 infection, HTN, Obesity is here for f/u. Pt has been on Eliquis x 2 months without any issues.   Review of Systems   Constitutional: Negative for activity change, appetite change, chills, diaphoresis, fatigue, fever and unexpected weight change.   HENT: Negative for postnasal drip, rhinorrhea, sinus pressure, sneezing, sore throat, trouble swallowing and voice change.    Respiratory: Negative for cough, shortness of breath and wheezing.    Cardiovascular: Negative for chest pain, palpitations and leg swelling.   Gastrointestinal: Negative for abdominal pain, blood in stool, constipation, diarrhea, nausea and vomiting.   Genitourinary: Negative for dysuria.   Musculoskeletal: Negative for arthralgias and myalgias.   Skin: Negative for rash and wound.   Allergic/Immunologic: Negative for environmental allergies and food allergies.   Hematological: Negative for adenopathy. Does not bruise/bleed easily.       Objective:      Physical Exam   Constitutional: She is oriented to person, place, and time. She appears well-developed and well-nourished. No distress.   HENT:   Head: Normocephalic and atraumatic.   Eyes: Pupils are equal, round, and reactive to light. Conjunctivae and EOM are normal. Right eye exhibits no discharge. Left eye exhibits no discharge. No scleral icterus.   Neck: Neck supple. No JVD present.   Cardiovascular: Normal rate, regular rhythm, normal heart sounds and intact distal pulses.   Pulmonary/Chest: Effort normal and breath sounds normal. No respiratory distress. She has no wheezes. She has no rales.   Musculoskeletal: She exhibits no edema.   Lymphadenopathy:     She has no cervical adenopathy.   Neurological: She is alert and oriented to person, place, and time.   Skin: Skin is warm and dry. No rash noted. She is not diaphoretic. No pallor.        Assessment:       1. Acute pulmonary embolism without acute cor pulmonale, unspecified pulmonary embolism type    2. HTN, goal below 130/80    3. Morbid obesity with BMI of 40.0-44.9, adult        Plan:    1. Stable, referral to hematology to determine duration of anticoagulation with Eliquis   2. Controlled on Norvasc 10 mg daily   3. Diet/exercise stressed

## 2020-06-25 ENCOUNTER — OFFICE VISIT (OUTPATIENT)
Dept: HEMATOLOGY/ONCOLOGY | Facility: CLINIC | Age: 55
End: 2020-06-25
Payer: COMMERCIAL

## 2020-06-25 VITALS
HEART RATE: 78 BPM | DIASTOLIC BLOOD PRESSURE: 79 MMHG | SYSTOLIC BLOOD PRESSURE: 125 MMHG | OXYGEN SATURATION: 98 % | RESPIRATION RATE: 16 BRPM | TEMPERATURE: 99 F

## 2020-06-25 DIAGNOSIS — I26.99 ACUTE PULMONARY EMBOLISM WITHOUT ACUTE COR PULMONALE, UNSPECIFIED PULMONARY EMBOLISM TYPE: ICD-10-CM

## 2020-06-25 PROCEDURE — 99204 OFFICE O/P NEW MOD 45 MIN: CPT | Mod: S$GLB,,, | Performed by: INTERNAL MEDICINE

## 2020-06-25 PROCEDURE — 3078F DIAST BP <80 MM HG: CPT | Mod: CPTII,S$GLB,, | Performed by: INTERNAL MEDICINE

## 2020-06-25 PROCEDURE — 3074F PR MOST RECENT SYSTOLIC BLOOD PRESSURE < 130 MM HG: ICD-10-PCS | Mod: CPTII,S$GLB,, | Performed by: INTERNAL MEDICINE

## 2020-06-25 PROCEDURE — 99999 PR PBB SHADOW E&M-EST. PATIENT-LVL IV: CPT | Mod: PBBFAC,,, | Performed by: INTERNAL MEDICINE

## 2020-06-25 PROCEDURE — 99204 PR OFFICE/OUTPT VISIT, NEW, LEVL IV, 45-59 MIN: ICD-10-PCS | Mod: S$GLB,,, | Performed by: INTERNAL MEDICINE

## 2020-06-25 PROCEDURE — 99999 PR PBB SHADOW E&M-EST. PATIENT-LVL IV: ICD-10-PCS | Mod: PBBFAC,,, | Performed by: INTERNAL MEDICINE

## 2020-06-25 PROCEDURE — 3078F PR MOST RECENT DIASTOLIC BLOOD PRESSURE < 80 MM HG: ICD-10-PCS | Mod: CPTII,S$GLB,, | Performed by: INTERNAL MEDICINE

## 2020-06-25 PROCEDURE — 3074F SYST BP LT 130 MM HG: CPT | Mod: CPTII,S$GLB,, | Performed by: INTERNAL MEDICINE

## 2020-06-25 NOTE — LETTER
June 26, 2020      Marlo Pandey, DO  [address] [facility] [place] [address]           [facility]  [address]  [place] [address]  Phone: [phone]          Patient: Lelia Mendoza   MR Number: [id number]   YOB: 1965   Date of Visit: 6/25/2020       Dear Dr. Marlo Pandey:    Thank you for referring Lelia Mendoza to me for evaluation. Attached you will find relevant portions of my assessment and plan of care.    If you have questions, please do not hesitate to call me. I look forward to following Lelia Mendoza along with you.    Sincerely,    Liliana Munson MD    Enclosure  CC:  No Recipients    If you would like to receive this communication electronically, please contact [email or web address] or [phone] to request more information on [email or web address] Link access.    For providers and/or their staff who would like to refer a patient to [facility], please contact us through our one-stop-shop provider referral line, [facility] , at [phone].    If you feel you have received this communication in error or would no longer like to receive these types of communications, please e-mail [email or web address]

## 2020-06-25 NOTE — PROGRESS NOTES
Subjective:       Patient ID: Lelia Mendoza is a 55 y.o. female.    Chief Complaint: Coagulation Disorder    HPI   Lelia presents today for recommendations on duration of anticoagulation after diagnosis of PE subsequent to COVID19 infection.  She has been on Eliquis oral anticoagulation for just over 2 months.  Tolerating anticoagulation. Had a single isolated event of hematuria that resolved by holding single dose of therapy.  Did not return after restarting anticoagulation.      Review of Systems   Constitutional: Negative.    HENT: Negative.    Eyes: Negative.    Respiratory: Negative.    Cardiovascular: Negative.    Gastrointestinal: Negative.    Endocrine: Negative.    Genitourinary: Negative.    Musculoskeletal: Negative.    Integumentary:  Negative.   Allergic/Immunologic: Negative for environmental allergies, food allergies and immunocompromised state.   Neurological: Negative.    Hematological: Negative for adenopathy. Does not bruise/bleed easily.   Psychiatric/Behavioral: Negative.          Objective:      Physical Exam  Vitals signs and nursing note reviewed.   Constitutional:       Appearance: She is well-developed.   HENT:      Head: Normocephalic and atraumatic.   Eyes:      General: No scleral icterus.     Conjunctiva/sclera: Conjunctivae normal.   Neck:      Musculoskeletal: Normal range of motion and neck supple.   Cardiovascular:      Rate and Rhythm: Normal rate.   Pulmonary:      Effort: Pulmonary effort is normal. No respiratory distress.   Abdominal:      General: There is no distension.      Palpations: Abdomen is soft.      Tenderness: There is no abdominal tenderness.   Musculoskeletal: Normal range of motion.   Skin:     General: Skin is warm and dry.   Neurological:      Mental Status: She is alert and oriented to person, place, and time.      Cranial Nerves: No cranial nerve deficit.   Psychiatric:         Behavior: Behavior normal.         Assessment:       1. Acute pulmonary embolism  without acute cor pulmonale, unspecified pulmonary embolism type        Plan:       Continue current anticoagulation for at least 3 months  Return in 1 month with CTA, US legs, and D dimer  If all tests are normal will stop Eliquis.  If any test is positive will extend therapy for total of 6 months.

## 2020-07-13 ENCOUNTER — TELEPHONE (OUTPATIENT)
Dept: HEMATOLOGY/ONCOLOGY | Facility: CLINIC | Age: 55
End: 2020-07-13

## 2020-07-13 NOTE — TELEPHONE ENCOUNTER
----- Message from Thais Pack sent at 7/13/2020  8:24 AM CDT -----  Contact: [employer]  Patient Advice/Staff Message     Caller name: Linh     Reason for call: Calling in regards to future test the pt has scheduled, the pt was told that her insurance wouldn't cover them. Insurance needs to know which test aren't being covered.    Do you feel you need to be seen today:: N/a         Communication Preference: [phone]    Additional Information:

## 2020-07-25 ENCOUNTER — TELEPHONE ENCOUNTER (OUTPATIENT)
Dept: URBAN - METROPOLITAN AREA CLINIC 13 | Facility: CLINIC | Age: 55
End: 2020-07-25

## 2020-07-26 ENCOUNTER — TELEPHONE ENCOUNTER (OUTPATIENT)
Dept: URBAN - METROPOLITAN AREA CLINIC 13 | Facility: CLINIC | Age: 55
End: 2020-07-26

## 2020-07-31 ENCOUNTER — HOSPITAL ENCOUNTER (OUTPATIENT)
Dept: RADIOLOGY | Facility: HOSPITAL | Age: 55
Discharge: HOME | End: 2020-07-31
Attending: INTERNAL MEDICINE
Payer: COMMERCIAL

## 2020-07-31 DIAGNOSIS — I26.99 ACUTE PULMONARY EMBOLISM WITHOUT ACUTE COR PULMONALE, UNSPECIFIED PULMONARY EMBOLISM TYPE: ICD-10-CM

## 2020-07-31 LAB
CREAT SERPL-MCNC: 0.7 MG/DL (ref 0.5–1.4)
SAMPLE: NORMAL

## 2020-07-31 PROCEDURE — 71275 CT ANGIOGRAPHY CHEST: CPT | Mod: 26,,, | Performed by: RADIOLOGY

## 2020-07-31 PROCEDURE — 71275 CT ANGIOGRAPHY CHEST: CPT | Mod: TC

## 2020-07-31 PROCEDURE — 93970 US LOWER EXTREMITY VEINS BILATERAL: ICD-10-PCS | Mod: 26,,, | Performed by: INTERNAL MEDICINE

## 2020-07-31 PROCEDURE — 93970 EXTREMITY STUDY: CPT | Mod: 26,,, | Performed by: INTERNAL MEDICINE

## 2020-07-31 PROCEDURE — 93970 EXTREMITY STUDY: CPT | Mod: TC

## 2020-07-31 PROCEDURE — 71275 CTA CHEST NON CORONARY: ICD-10-PCS | Mod: 26,,, | Performed by: RADIOLOGY

## 2020-07-31 PROCEDURE — 25500020 PHARM REV CODE 255: Performed by: INTERNAL MEDICINE

## 2020-07-31 RX ADMIN — IOHEXOL 100 ML: 350 INJECTION, SOLUTION INTRAVENOUS at 11:07

## 2020-08-03 ENCOUNTER — OFFICE VISIT (OUTPATIENT)
Dept: HEMATOLOGY/ONCOLOGY | Facility: CLINIC | Age: 55
End: 2020-08-03
Payer: COMMERCIAL

## 2020-08-03 VITALS
OXYGEN SATURATION: 100 % | SYSTOLIC BLOOD PRESSURE: 130 MMHG | BODY MASS INDEX: 44.54 KG/M2 | RESPIRATION RATE: 18 BRPM | HEIGHT: 60 IN | TEMPERATURE: 99 F | HEART RATE: 66 BPM | WEIGHT: 226.88 LBS | DIASTOLIC BLOOD PRESSURE: 75 MMHG

## 2020-08-03 DIAGNOSIS — I26.99 ACUTE PULMONARY EMBOLISM WITHOUT ACUTE COR PULMONALE, UNSPECIFIED PULMONARY EMBOLISM TYPE: Primary | ICD-10-CM

## 2020-08-03 PROCEDURE — 99213 OFFICE O/P EST LOW 20 MIN: CPT | Mod: S$GLB,,, | Performed by: INTERNAL MEDICINE

## 2020-08-03 PROCEDURE — 3075F PR MOST RECENT SYSTOLIC BLOOD PRESS GE 130-139MM HG: ICD-10-PCS | Mod: CPTII,S$GLB,, | Performed by: INTERNAL MEDICINE

## 2020-08-03 PROCEDURE — 99999 PR PBB SHADOW E&M-EST. PATIENT-LVL III: ICD-10-PCS | Mod: PBBFAC,,, | Performed by: INTERNAL MEDICINE

## 2020-08-03 PROCEDURE — 3078F PR MOST RECENT DIASTOLIC BLOOD PRESSURE < 80 MM HG: ICD-10-PCS | Mod: CPTII,S$GLB,, | Performed by: INTERNAL MEDICINE

## 2020-08-03 PROCEDURE — 3078F DIAST BP <80 MM HG: CPT | Mod: CPTII,S$GLB,, | Performed by: INTERNAL MEDICINE

## 2020-08-03 PROCEDURE — 99999 PR PBB SHADOW E&M-EST. PATIENT-LVL III: CPT | Mod: PBBFAC,,, | Performed by: INTERNAL MEDICINE

## 2020-08-03 PROCEDURE — 99213 PR OFFICE/OUTPT VISIT, EST, LEVL III, 20-29 MIN: ICD-10-PCS | Mod: S$GLB,,, | Performed by: INTERNAL MEDICINE

## 2020-08-03 PROCEDURE — 3008F BODY MASS INDEX DOCD: CPT | Mod: CPTII,S$GLB,, | Performed by: INTERNAL MEDICINE

## 2020-08-03 PROCEDURE — 3008F PR BODY MASS INDEX (BMI) DOCUMENTED: ICD-10-PCS | Mod: CPTII,S$GLB,, | Performed by: INTERNAL MEDICINE

## 2020-08-03 PROCEDURE — 3075F SYST BP GE 130 - 139MM HG: CPT | Mod: CPTII,S$GLB,, | Performed by: INTERNAL MEDICINE

## 2020-08-03 NOTE — PROGRESS NOTES
Subjective:       Patient ID: Lelia Mendoza is a 55 y.o. female.    Chief Complaint: No chief complaint on file.    HPI   Lelia presents today for recommendations on duration of anticoagulation after diagnosis of PE subsequent to COVID19 infection.  She has been on Eliquis oral anticoagulation for just over 2 months.  Tolerating anticoagulation. Had a single isolated event of hematuria that resolved by holding single dose of therapy.  Did not return after restarting anticoagulation.    Follow-up Visit 8/3/2020  US of lower extremities is normal  D dimer is normal  CTA of chest shows what is likely chronic changes from original event      Review of Systems   Constitutional: Negative.    HENT: Negative.    Eyes: Negative.    Respiratory: Negative.    Cardiovascular: Negative.    Gastrointestinal: Negative.    Endocrine: Negative.    Genitourinary: Negative.    Musculoskeletal: Negative.    Integumentary:  Negative.   Allergic/Immunologic: Negative for environmental allergies, food allergies and immunocompromised state.   Neurological: Negative.    Hematological: Negative for adenopathy. Does not bruise/bleed easily.   Psychiatric/Behavioral: Negative.          Objective:      Physical Exam  Vitals signs and nursing note reviewed.   Constitutional:       Appearance: She is well-developed.   HENT:      Head: Normocephalic and atraumatic.   Eyes:      General: No scleral icterus.     Conjunctiva/sclera: Conjunctivae normal.   Neck:      Musculoskeletal: Normal range of motion and neck supple.   Cardiovascular:      Rate and Rhythm: Normal rate.   Pulmonary:      Effort: Pulmonary effort is normal. No respiratory distress.   Abdominal:      General: There is no distension.      Palpations: Abdomen is soft.      Tenderness: There is no abdominal tenderness.   Musculoskeletal: Normal range of motion.   Skin:     General: Skin is warm and dry.   Neurological:      Mental Status: She is alert and oriented to person, place,  and time.      Cranial Nerves: No cranial nerve deficit.   Psychiatric:         Behavior: Behavior normal.         Assessment:       1. Acute pulmonary embolism without acute cor pulmonale, unspecified pulmonary embolism type        Plan:       Refilled Eliquis today for 3 months supply  Discussed take for next 3 months to finish 6 months total, then therapy is complete

## 2020-10-05 ENCOUNTER — PATIENT MESSAGE (OUTPATIENT)
Dept: ADMINISTRATIVE | Facility: HOSPITAL | Age: 55
End: 2020-10-05

## 2020-12-11 ENCOUNTER — PATIENT MESSAGE (OUTPATIENT)
Dept: OTHER | Facility: OTHER | Age: 55
End: 2020-12-11

## 2021-01-04 ENCOUNTER — PATIENT MESSAGE (OUTPATIENT)
Dept: ADMINISTRATIVE | Facility: HOSPITAL | Age: 56
End: 2021-01-04

## 2021-01-07 DIAGNOSIS — M25.552 LEFT HIP PAIN: Primary | ICD-10-CM

## 2021-01-11 ENCOUNTER — HOSPITAL ENCOUNTER (OUTPATIENT)
Dept: RADIOLOGY | Facility: HOSPITAL | Age: 56
Discharge: HOME | End: 2021-01-11
Attending: PHYSICIAN ASSISTANT
Payer: COMMERCIAL

## 2021-01-11 ENCOUNTER — OFFICE VISIT (OUTPATIENT)
Dept: ORTHOPEDICS | Facility: CLINIC | Age: 56
End: 2021-01-11
Payer: COMMERCIAL

## 2021-01-11 VITALS
HEART RATE: 89 BPM | BODY MASS INDEX: 48.04 KG/M2 | WEIGHT: 246 LBS | SYSTOLIC BLOOD PRESSURE: 129 MMHG | DIASTOLIC BLOOD PRESSURE: 85 MMHG

## 2021-01-11 DIAGNOSIS — G89.29 HIP PAIN, CHRONIC, LEFT: ICD-10-CM

## 2021-01-11 DIAGNOSIS — E66.01 MORBID OBESITY DUE TO EXCESS CALORIES: ICD-10-CM

## 2021-01-11 DIAGNOSIS — M25.552 HIP PAIN, CHRONIC, LEFT: ICD-10-CM

## 2021-01-11 DIAGNOSIS — M16.12 PRIMARY OSTEOARTHRITIS OF LEFT HIP: Primary | ICD-10-CM

## 2021-01-11 DIAGNOSIS — M25.552 LEFT HIP PAIN: ICD-10-CM

## 2021-01-11 PROCEDURE — 73502 XR HIP 2 VIEW LEFT: ICD-10-PCS | Mod: 26,LT,, | Performed by: RADIOLOGY

## 2021-01-11 PROCEDURE — 99999 PR PBB SHADOW E&M-EST. PATIENT-LVL IV: CPT | Mod: PBBFAC,,, | Performed by: PHYSICIAN ASSISTANT

## 2021-01-11 PROCEDURE — 99203 OFFICE O/P NEW LOW 30 MIN: CPT | Mod: S$GLB,,, | Performed by: PHYSICIAN ASSISTANT

## 2021-01-11 PROCEDURE — 1125F PR PAIN SEVERITY QUANTIFIED, PAIN PRESENT: ICD-10-PCS | Mod: S$GLB,,, | Performed by: PHYSICIAN ASSISTANT

## 2021-01-11 PROCEDURE — 3079F DIAST BP 80-89 MM HG: CPT | Mod: CPTII,S$GLB,, | Performed by: PHYSICIAN ASSISTANT

## 2021-01-11 PROCEDURE — 3074F SYST BP LT 130 MM HG: CPT | Mod: CPTII,S$GLB,, | Performed by: PHYSICIAN ASSISTANT

## 2021-01-11 PROCEDURE — 99203 PR OFFICE/OUTPT VISIT, NEW, LEVL III, 30-44 MIN: ICD-10-PCS | Mod: S$GLB,,, | Performed by: PHYSICIAN ASSISTANT

## 2021-01-11 PROCEDURE — 73502 X-RAY EXAM HIP UNI 2-3 VIEWS: CPT | Mod: TC,FY,LT

## 2021-01-11 PROCEDURE — 73502 X-RAY EXAM HIP UNI 2-3 VIEWS: CPT | Mod: 26,LT,, | Performed by: RADIOLOGY

## 2021-01-11 PROCEDURE — 1125F AMNT PAIN NOTED PAIN PRSNT: CPT | Mod: S$GLB,,, | Performed by: PHYSICIAN ASSISTANT

## 2021-01-11 PROCEDURE — 3008F BODY MASS INDEX DOCD: CPT | Mod: CPTII,S$GLB,, | Performed by: PHYSICIAN ASSISTANT

## 2021-01-11 PROCEDURE — 3074F PR MOST RECENT SYSTOLIC BLOOD PRESSURE < 130 MM HG: ICD-10-PCS | Mod: CPTII,S$GLB,, | Performed by: PHYSICIAN ASSISTANT

## 2021-01-11 PROCEDURE — 3079F PR MOST RECENT DIASTOLIC BLOOD PRESSURE 80-89 MM HG: ICD-10-PCS | Mod: CPTII,S$GLB,, | Performed by: PHYSICIAN ASSISTANT

## 2021-01-11 PROCEDURE — 3008F PR BODY MASS INDEX (BMI) DOCUMENTED: ICD-10-PCS | Mod: CPTII,S$GLB,, | Performed by: PHYSICIAN ASSISTANT

## 2021-01-11 PROCEDURE — 99999 PR PBB SHADOW E&M-EST. PATIENT-LVL IV: ICD-10-PCS | Mod: PBBFAC,,, | Performed by: PHYSICIAN ASSISTANT

## 2021-03-28 DIAGNOSIS — I10 ESSENTIAL HYPERTENSION: ICD-10-CM

## 2021-03-30 RX ORDER — AMLODIPINE BESYLATE 10 MG/1
TABLET ORAL
Qty: 90 TABLET | Refills: 0 | Status: SHIPPED | OUTPATIENT
Start: 2021-03-30

## 2021-04-05 ENCOUNTER — PATIENT MESSAGE (OUTPATIENT)
Dept: ADMINISTRATIVE | Facility: HOSPITAL | Age: 56
End: 2021-04-05

## 2021-05-17 ENCOUNTER — PATIENT MESSAGE (OUTPATIENT)
Dept: INTERNAL MEDICINE | Facility: CLINIC | Age: 56
End: 2021-05-17

## 2021-06-07 ENCOUNTER — TELEPHONE (OUTPATIENT)
Dept: INTERNAL MEDICINE | Facility: CLINIC | Age: 56
End: 2021-06-07

## 2021-06-07 DIAGNOSIS — E78.5 HYPERLIPIDEMIA WITH TARGET LDL LESS THAN 130: ICD-10-CM

## 2021-06-07 DIAGNOSIS — Z00.00 ANNUAL PHYSICAL EXAM: Primary | ICD-10-CM

## 2021-06-07 DIAGNOSIS — I10 ESSENTIAL HYPERTENSION: ICD-10-CM

## 2021-07-06 ENCOUNTER — PATIENT MESSAGE (OUTPATIENT)
Dept: ADMINISTRATIVE | Facility: HOSPITAL | Age: 56
End: 2021-07-06

## 2021-07-21 DIAGNOSIS — Z12.31 OTHER SCREENING MAMMOGRAM: ICD-10-CM

## 2021-07-28 ENCOUNTER — LAB VISIT (OUTPATIENT)
Dept: LAB | Facility: HOSPITAL | Age: 56
End: 2021-07-28
Payer: COMMERCIAL

## 2021-07-28 DIAGNOSIS — E78.5 HYPERLIPIDEMIA WITH TARGET LDL LESS THAN 130: ICD-10-CM

## 2021-07-28 DIAGNOSIS — Z00.00 ANNUAL PHYSICAL EXAM: ICD-10-CM

## 2021-07-28 DIAGNOSIS — I10 ESSENTIAL HYPERTENSION: ICD-10-CM

## 2021-07-28 LAB
ALBUMIN SERPL BCP-MCNC: 3.8 G/DL (ref 3.5–5.2)
ALP SERPL-CCNC: 128 U/L (ref 55–135)
ALT SERPL W/O P-5'-P-CCNC: 21 U/L (ref 10–44)
ANION GAP SERPL CALC-SCNC: 10 MMOL/L (ref 8–16)
AST SERPL-CCNC: 17 U/L (ref 10–40)
BASOPHILS # BLD AUTO: 0.02 K/UL (ref 0–0.2)
BASOPHILS NFR BLD: 0.3 % (ref 0–1.9)
BILIRUB SERPL-MCNC: 0.6 MG/DL (ref 0.1–1)
BUN SERPL-MCNC: 12 MG/DL (ref 6–20)
CALCIUM SERPL-MCNC: 9.3 MG/DL (ref 8.7–10.5)
CHLORIDE SERPL-SCNC: 110 MMOL/L (ref 95–110)
CHOLEST SERPL-MCNC: 193 MG/DL (ref 120–199)
CHOLEST/HDLC SERPL: 2.8 {RATIO} (ref 2–5)
CO2 SERPL-SCNC: 25 MMOL/L (ref 23–29)
CREAT SERPL-MCNC: 0.8 MG/DL (ref 0.5–1.4)
DIFFERENTIAL METHOD: ABNORMAL
EOSINOPHIL # BLD AUTO: 0.2 K/UL (ref 0–0.5)
EOSINOPHIL NFR BLD: 2.7 % (ref 0–8)
ERYTHROCYTE [DISTWIDTH] IN BLOOD BY AUTOMATED COUNT: 15.7 % (ref 11.5–14.5)
EST. GFR  (AFRICAN AMERICAN): >60 ML/MIN/1.73 M^2
EST. GFR  (NON AFRICAN AMERICAN): >60 ML/MIN/1.73 M^2
GLUCOSE SERPL-MCNC: 80 MG/DL (ref 70–110)
HCT VFR BLD AUTO: 39.2 % (ref 37–48.5)
HDLC SERPL-MCNC: 68 MG/DL (ref 40–75)
HDLC SERPL: 35.2 % (ref 20–50)
HGB BLD-MCNC: 12.1 G/DL (ref 12–16)
IMM GRANULOCYTES # BLD AUTO: 0.02 K/UL (ref 0–0.04)
IMM GRANULOCYTES NFR BLD AUTO: 0.3 % (ref 0–0.5)
LDLC SERPL CALC-MCNC: 102 MG/DL (ref 63–159)
LYMPHOCYTES # BLD AUTO: 2.8 K/UL (ref 1–4.8)
LYMPHOCYTES NFR BLD: 43.5 % (ref 18–48)
MCH RBC QN AUTO: 28.7 PG (ref 27–31)
MCHC RBC AUTO-ENTMCNC: 30.9 G/DL (ref 32–36)
MCV RBC AUTO: 93 FL (ref 82–98)
MONOCYTES # BLD AUTO: 0.5 K/UL (ref 0.3–1)
MONOCYTES NFR BLD: 7.2 % (ref 4–15)
NEUTROPHILS # BLD AUTO: 2.9 K/UL (ref 1.8–7.7)
NEUTROPHILS NFR BLD: 46 % (ref 38–73)
NONHDLC SERPL-MCNC: 125 MG/DL
NRBC BLD-RTO: 0 /100 WBC
PLATELET # BLD AUTO: 278 K/UL (ref 150–450)
PMV BLD AUTO: 10.6 FL (ref 9.2–12.9)
POTASSIUM SERPL-SCNC: 3.6 MMOL/L (ref 3.5–5.1)
PROT SERPL-MCNC: 7 G/DL (ref 6–8.4)
RBC # BLD AUTO: 4.22 M/UL (ref 4–5.4)
SODIUM SERPL-SCNC: 145 MMOL/L (ref 136–145)
T4 FREE SERPL-MCNC: 1 NG/DL (ref 0.71–1.51)
TRIGL SERPL-MCNC: 115 MG/DL (ref 30–150)
TSH SERPL DL<=0.005 MIU/L-ACNC: 1.18 UIU/ML (ref 0.4–4)
WBC # BLD AUTO: 6.35 K/UL (ref 3.9–12.7)

## 2021-07-28 PROCEDURE — 84443 ASSAY THYROID STIM HORMONE: CPT | Performed by: INTERNAL MEDICINE

## 2021-07-28 PROCEDURE — 80053 COMPREHEN METABOLIC PANEL: CPT | Performed by: INTERNAL MEDICINE

## 2021-07-28 PROCEDURE — 84439 ASSAY OF FREE THYROXINE: CPT | Performed by: INTERNAL MEDICINE

## 2021-07-28 PROCEDURE — 85025 COMPLETE CBC W/AUTO DIFF WBC: CPT | Performed by: INTERNAL MEDICINE

## 2021-07-28 PROCEDURE — 80061 LIPID PANEL: CPT | Performed by: INTERNAL MEDICINE

## 2021-07-28 PROCEDURE — 36415 COLL VENOUS BLD VENIPUNCTURE: CPT | Mod: PN | Performed by: INTERNAL MEDICINE

## 2021-08-16 ENCOUNTER — OFFICE VISIT (OUTPATIENT)
Dept: INTERNAL MEDICINE | Facility: CLINIC | Age: 56
End: 2021-08-16
Payer: COMMERCIAL

## 2021-08-16 VITALS
TEMPERATURE: 97 F | BODY MASS INDEX: 48.74 KG/M2 | SYSTOLIC BLOOD PRESSURE: 144 MMHG | HEIGHT: 60 IN | WEIGHT: 248.25 LBS | DIASTOLIC BLOOD PRESSURE: 92 MMHG | HEART RATE: 72 BPM | RESPIRATION RATE: 18 BRPM

## 2021-08-16 DIAGNOSIS — Z12.4 SCREENING FOR CERVICAL CANCER: ICD-10-CM

## 2021-08-16 DIAGNOSIS — I10 ESSENTIAL HYPERTENSION: ICD-10-CM

## 2021-08-16 DIAGNOSIS — R43.9 PROBLEMS WITH SMELL AND TASTE: ICD-10-CM

## 2021-08-16 DIAGNOSIS — R43.2 LOSS OF TASTE: ICD-10-CM

## 2021-08-16 DIAGNOSIS — R53.83 FATIGUE, UNSPECIFIED TYPE: ICD-10-CM

## 2021-08-16 DIAGNOSIS — M25.552 HIP PAIN, CHRONIC, LEFT: ICD-10-CM

## 2021-08-16 DIAGNOSIS — Z13.5 SCREENING FOR EYE CONDITION: ICD-10-CM

## 2021-08-16 DIAGNOSIS — E66.01 MORBID OBESITY WITH BMI OF 40.0-44.9, ADULT: ICD-10-CM

## 2021-08-16 DIAGNOSIS — G89.29 HIP PAIN, CHRONIC, LEFT: ICD-10-CM

## 2021-08-16 DIAGNOSIS — E78.5 HYPERLIPIDEMIA WITH TARGET LDL LESS THAN 130: ICD-10-CM

## 2021-08-16 DIAGNOSIS — Z00.00 ANNUAL PHYSICAL EXAM: Primary | ICD-10-CM

## 2021-08-16 PROCEDURE — 1159F MED LIST DOCD IN RCRD: CPT | Mod: CPTII,S$GLB,, | Performed by: NURSE PRACTITIONER

## 2021-08-16 PROCEDURE — 3080F DIAST BP >= 90 MM HG: CPT | Mod: CPTII,S$GLB,, | Performed by: NURSE PRACTITIONER

## 2021-08-16 PROCEDURE — 1125F PR PAIN SEVERITY QUANTIFIED, PAIN PRESENT: ICD-10-PCS | Mod: CPTII,S$GLB,, | Performed by: NURSE PRACTITIONER

## 2021-08-16 PROCEDURE — 1159F PR MEDICATION LIST DOCUMENTED IN MEDICAL RECORD: ICD-10-PCS | Mod: CPTII,S$GLB,, | Performed by: NURSE PRACTITIONER

## 2021-08-16 PROCEDURE — 1125F AMNT PAIN NOTED PAIN PRSNT: CPT | Mod: CPTII,S$GLB,, | Performed by: NURSE PRACTITIONER

## 2021-08-16 PROCEDURE — 99214 PR OFFICE/OUTPT VISIT, EST, LEVL IV, 30-39 MIN: ICD-10-PCS | Mod: S$GLB,,, | Performed by: NURSE PRACTITIONER

## 2021-08-16 PROCEDURE — 99999 PR PBB SHADOW E&M-EST. PATIENT-LVL V: CPT | Mod: PBBFAC,,, | Performed by: NURSE PRACTITIONER

## 2021-08-16 PROCEDURE — 99214 OFFICE O/P EST MOD 30 MIN: CPT | Mod: S$GLB,,, | Performed by: NURSE PRACTITIONER

## 2021-08-16 PROCEDURE — 99999 PR PBB SHADOW E&M-EST. PATIENT-LVL V: ICD-10-PCS | Mod: PBBFAC,,, | Performed by: NURSE PRACTITIONER

## 2021-08-16 PROCEDURE — 3077F SYST BP >= 140 MM HG: CPT | Mod: CPTII,S$GLB,, | Performed by: NURSE PRACTITIONER

## 2021-08-16 PROCEDURE — 3008F PR BODY MASS INDEX (BMI) DOCUMENTED: ICD-10-PCS | Mod: CPTII,S$GLB,, | Performed by: NURSE PRACTITIONER

## 2021-08-16 PROCEDURE — U0005 INFEC AGEN DETEC AMPLI PROBE: HCPCS | Performed by: NURSE PRACTITIONER

## 2021-08-16 PROCEDURE — 3077F PR MOST RECENT SYSTOLIC BLOOD PRESSURE >= 140 MM HG: ICD-10-PCS | Mod: CPTII,S$GLB,, | Performed by: NURSE PRACTITIONER

## 2021-08-16 PROCEDURE — U0003 INFECTIOUS AGENT DETECTION BY NUCLEIC ACID (DNA OR RNA); SEVERE ACUTE RESPIRATORY SYNDROME CORONAVIRUS 2 (SARS-COV-2) (CORONAVIRUS DISEASE [COVID-19]), AMPLIFIED PROBE TECHNIQUE, MAKING USE OF HIGH THROUGHPUT TECHNOLOGIES AS DESCRIBED BY CMS-2020-01-R: HCPCS | Performed by: NURSE PRACTITIONER

## 2021-08-16 PROCEDURE — 3080F PR MOST RECENT DIASTOLIC BLOOD PRESSURE >= 90 MM HG: ICD-10-PCS | Mod: CPTII,S$GLB,, | Performed by: NURSE PRACTITIONER

## 2021-08-16 PROCEDURE — 3008F BODY MASS INDEX DOCD: CPT | Mod: CPTII,S$GLB,, | Performed by: NURSE PRACTITIONER

## 2021-08-16 RX ORDER — CALCIUM CARB/VITAMIN D3/VIT K1 500-500-40
TABLET,CHEWABLE ORAL
COMMUNITY

## 2021-08-16 RX ORDER — HYDROCODONE BITARTRATE AND ACETAMINOPHEN 10; 325 MG/1; MG/1
1 TABLET ORAL EVERY 6 HOURS PRN
COMMUNITY
Start: 2021-05-13 | End: 2022-08-11 | Stop reason: ALTCHOICE

## 2021-08-16 RX ORDER — ERGOCALCIFEROL 1.25 MG/1
1 CAPSULE ORAL
COMMUNITY
Start: 2021-05-10 | End: 2022-05-10

## 2021-08-16 RX ORDER — ORLISTAT 120 MG
120 CAPSULE ORAL 3 TIMES DAILY
COMMUNITY
Start: 2021-05-04

## 2021-08-16 RX ORDER — ATORVASTATIN CALCIUM 20 MG/1
20 TABLET, FILM COATED ORAL DAILY
COMMUNITY
Start: 2021-08-11

## 2021-08-16 RX ORDER — ASPIRIN 81 MG/1
81 TABLET ORAL 2 TIMES DAILY
COMMUNITY
Start: 2021-05-13

## 2021-08-16 RX ORDER — ERGOCALCIFEROL 1.25 MG/1
CAPSULE ORAL
COMMUNITY
Start: 2021-07-16

## 2021-08-17 LAB
SARS-COV-2 RNA RESP QL NAA+PROBE: NOT DETECTED
SARS-COV-2- CYCLE NUMBER: -1

## 2021-08-25 ENCOUNTER — OFFICE VISIT (OUTPATIENT)
Dept: OBSTETRICS AND GYNECOLOGY | Facility: CLINIC | Age: 56
End: 2021-08-25
Payer: COMMERCIAL

## 2021-08-25 ENCOUNTER — PATIENT MESSAGE (OUTPATIENT)
Dept: OBSTETRICS AND GYNECOLOGY | Facility: CLINIC | Age: 56
End: 2021-08-25

## 2021-08-25 VITALS
SYSTOLIC BLOOD PRESSURE: 124 MMHG | WEIGHT: 248.88 LBS | DIASTOLIC BLOOD PRESSURE: 90 MMHG | BODY MASS INDEX: 48.61 KG/M2

## 2021-08-25 DIAGNOSIS — Z01.419 WELL WOMAN EXAM WITH ROUTINE GYNECOLOGICAL EXAM: Primary | ICD-10-CM

## 2021-08-25 DIAGNOSIS — Z11.3 SCREEN FOR STD (SEXUALLY TRANSMITTED DISEASE): ICD-10-CM

## 2021-08-25 DIAGNOSIS — Z12.11 SCREENING FOR MALIGNANT NEOPLASM OF COLON: ICD-10-CM

## 2021-08-25 DIAGNOSIS — N89.8 VAGINAL DRYNESS: ICD-10-CM

## 2021-08-25 PROCEDURE — 3074F SYST BP LT 130 MM HG: CPT | Mod: CPTII,S$GLB,, | Performed by: NURSE PRACTITIONER

## 2021-08-25 PROCEDURE — 3008F PR BODY MASS INDEX (BMI) DOCUMENTED: ICD-10-PCS | Mod: CPTII,S$GLB,, | Performed by: NURSE PRACTITIONER

## 2021-08-25 PROCEDURE — 99386 PR PREVENTIVE VISIT,NEW,40-64: ICD-10-PCS | Mod: S$GLB,,, | Performed by: NURSE PRACTITIONER

## 2021-08-25 PROCEDURE — 3074F PR MOST RECENT SYSTOLIC BLOOD PRESSURE < 130 MM HG: ICD-10-PCS | Mod: CPTII,S$GLB,, | Performed by: NURSE PRACTITIONER

## 2021-08-25 PROCEDURE — 3080F PR MOST RECENT DIASTOLIC BLOOD PRESSURE >= 90 MM HG: ICD-10-PCS | Mod: CPTII,S$GLB,, | Performed by: NURSE PRACTITIONER

## 2021-08-25 PROCEDURE — 99999 PR PBB SHADOW E&M-EST. PATIENT-LVL III: ICD-10-PCS | Mod: PBBFAC,,, | Performed by: NURSE PRACTITIONER

## 2021-08-25 PROCEDURE — 3080F DIAST BP >= 90 MM HG: CPT | Mod: CPTII,S$GLB,, | Performed by: NURSE PRACTITIONER

## 2021-08-25 PROCEDURE — 1126F AMNT PAIN NOTED NONE PRSNT: CPT | Mod: CPTII,S$GLB,, | Performed by: NURSE PRACTITIONER

## 2021-08-25 PROCEDURE — 1159F PR MEDICATION LIST DOCUMENTED IN MEDICAL RECORD: ICD-10-PCS | Mod: CPTII,S$GLB,, | Performed by: NURSE PRACTITIONER

## 2021-08-25 PROCEDURE — 99386 PREV VISIT NEW AGE 40-64: CPT | Mod: S$GLB,,, | Performed by: NURSE PRACTITIONER

## 2021-08-25 PROCEDURE — 1160F RVW MEDS BY RX/DR IN RCRD: CPT | Mod: CPTII,S$GLB,, | Performed by: NURSE PRACTITIONER

## 2021-08-25 PROCEDURE — 3008F BODY MASS INDEX DOCD: CPT | Mod: CPTII,S$GLB,, | Performed by: NURSE PRACTITIONER

## 2021-08-25 PROCEDURE — 1159F MED LIST DOCD IN RCRD: CPT | Mod: CPTII,S$GLB,, | Performed by: NURSE PRACTITIONER

## 2021-08-25 PROCEDURE — 87481 CANDIDA DNA AMP PROBE: CPT | Mod: 59 | Performed by: NURSE PRACTITIONER

## 2021-08-25 PROCEDURE — 87491 CHLMYD TRACH DNA AMP PROBE: CPT | Performed by: NURSE PRACTITIONER

## 2021-08-25 PROCEDURE — 1126F PR PAIN SEVERITY QUANTIFIED, NO PAIN PRESENT: ICD-10-PCS | Mod: CPTII,S$GLB,, | Performed by: NURSE PRACTITIONER

## 2021-08-25 PROCEDURE — 99999 PR PBB SHADOW E&M-EST. PATIENT-LVL III: CPT | Mod: PBBFAC,,, | Performed by: NURSE PRACTITIONER

## 2021-08-25 PROCEDURE — 87591 N.GONORRHOEAE DNA AMP PROB: CPT | Mod: 59 | Performed by: NURSE PRACTITIONER

## 2021-08-25 PROCEDURE — 1160F PR REVIEW ALL MEDS BY PRESCRIBER/CLIN PHARMACIST DOCUMENTED: ICD-10-PCS | Mod: CPTII,S$GLB,, | Performed by: NURSE PRACTITIONER

## 2021-08-25 RX ORDER — PRASTERONE 6.5 MG/1
6.5 INSERT VAGINAL NIGHTLY
Qty: 30 EACH | Refills: 11 | Status: SHIPPED | OUTPATIENT
Start: 2021-08-25 | End: 2022-08-11

## 2021-08-26 LAB
C TRACH DNA SPEC QL NAA+PROBE: NOT DETECTED
N GONORRHOEA DNA SPEC QL NAA+PROBE: NOT DETECTED

## 2021-08-28 LAB
BACTERIAL VAGINOSIS DNA: NEGATIVE
CANDIDA GLABRATA DNA: NEGATIVE
CANDIDA KRUSEI DNA: NEGATIVE
CANDIDA RRNA VAG QL PROBE: NEGATIVE
T VAGINALIS RRNA GENITAL QL PROBE: NEGATIVE

## 2021-10-04 ENCOUNTER — PATIENT MESSAGE (OUTPATIENT)
Dept: ADMINISTRATIVE | Facility: HOSPITAL | Age: 56
End: 2021-10-04

## 2021-12-29 ENCOUNTER — TELEPHONE ENCOUNTER (OUTPATIENT)
Dept: URBAN - METROPOLITAN AREA CLINIC 113 | Facility: CLINIC | Age: 56
End: 2021-12-29

## 2022-01-18 ENCOUNTER — PATIENT MESSAGE (OUTPATIENT)
Dept: ADMINISTRATIVE | Facility: HOSPITAL | Age: 57
End: 2022-01-18
Payer: COMMERCIAL

## 2022-05-30 ENCOUNTER — PATIENT MESSAGE (OUTPATIENT)
Dept: ADMINISTRATIVE | Facility: HOSPITAL | Age: 57
End: 2022-05-30
Payer: COMMERCIAL

## 2022-08-11 ENCOUNTER — OFFICE VISIT (OUTPATIENT)
Dept: URGENT CARE | Facility: CLINIC | Age: 57
End: 2022-08-11
Payer: COMMERCIAL

## 2022-08-11 VITALS
SYSTOLIC BLOOD PRESSURE: 151 MMHG | TEMPERATURE: 97 F | HEART RATE: 53 BPM | BODY MASS INDEX: 45.16 KG/M2 | DIASTOLIC BLOOD PRESSURE: 88 MMHG | WEIGHT: 230 LBS | OXYGEN SATURATION: 98 % | HEIGHT: 60 IN | RESPIRATION RATE: 20 BRPM

## 2022-08-11 DIAGNOSIS — R11.0 NAUSEA: Primary | ICD-10-CM

## 2022-08-11 DIAGNOSIS — R53.83 MALAISE AND FATIGUE: ICD-10-CM

## 2022-08-11 DIAGNOSIS — R53.81 MALAISE AND FATIGUE: ICD-10-CM

## 2022-08-11 LAB
BILIRUB UR QL STRIP: NEGATIVE
CTP QC/QA: YES
GLUCOSE SERPL-MCNC: 69 MG/DL (ref 70–110)
GLUCOSE UR QL STRIP: NEGATIVE
KETONES UR QL STRIP: NEGATIVE
LEUKOCYTE ESTERASE UR QL STRIP: NEGATIVE
PH, POC UA: 6 (ref 5–8)
POC BLOOD, URINE: NEGATIVE
POC NITRATES, URINE: NEGATIVE
PROT UR QL STRIP: NEGATIVE
SARS-COV-2 RDRP RESP QL NAA+PROBE: NEGATIVE
SP GR UR STRIP: 1.01 (ref 1–1.03)
UROBILINOGEN UR STRIP-ACNC: NORMAL (ref 0.1–1.1)

## 2022-08-11 PROCEDURE — 82962 GLUCOSE BLOOD TEST: CPT | Mod: S$GLB,,, | Performed by: EMERGENCY MEDICINE

## 2022-08-11 PROCEDURE — 1159F MED LIST DOCD IN RCRD: CPT | Mod: CPTII,S$GLB,, | Performed by: EMERGENCY MEDICINE

## 2022-08-11 PROCEDURE — 99214 PR OFFICE/OUTPT VISIT, EST, LEVL IV, 30-39 MIN: ICD-10-PCS | Mod: S$GLB,,, | Performed by: EMERGENCY MEDICINE

## 2022-08-11 PROCEDURE — 82962 POCT GLUCOSE, HAND-HELD DEVICE: ICD-10-PCS | Mod: S$GLB,,, | Performed by: EMERGENCY MEDICINE

## 2022-08-11 PROCEDURE — U0002 COVID-19 LAB TEST NON-CDC: HCPCS | Mod: QW,S$GLB,, | Performed by: EMERGENCY MEDICINE

## 2022-08-11 PROCEDURE — 81003 URINALYSIS AUTO W/O SCOPE: CPT | Mod: QW,S$GLB,, | Performed by: EMERGENCY MEDICINE

## 2022-08-11 PROCEDURE — 1160F PR REVIEW ALL MEDS BY PRESCRIBER/CLIN PHARMACIST DOCUMENTED: ICD-10-PCS | Mod: CPTII,S$GLB,, | Performed by: EMERGENCY MEDICINE

## 2022-08-11 PROCEDURE — 3079F DIAST BP 80-89 MM HG: CPT | Mod: CPTII,S$GLB,, | Performed by: EMERGENCY MEDICINE

## 2022-08-11 PROCEDURE — 3077F PR MOST RECENT SYSTOLIC BLOOD PRESSURE >= 140 MM HG: ICD-10-PCS | Mod: CPTII,S$GLB,, | Performed by: EMERGENCY MEDICINE

## 2022-08-11 PROCEDURE — U0002: ICD-10-PCS | Mod: QW,S$GLB,, | Performed by: EMERGENCY MEDICINE

## 2022-08-11 PROCEDURE — 1159F PR MEDICATION LIST DOCUMENTED IN MEDICAL RECORD: ICD-10-PCS | Mod: CPTII,S$GLB,, | Performed by: EMERGENCY MEDICINE

## 2022-08-11 PROCEDURE — 3077F SYST BP >= 140 MM HG: CPT | Mod: CPTII,S$GLB,, | Performed by: EMERGENCY MEDICINE

## 2022-08-11 PROCEDURE — 1160F RVW MEDS BY RX/DR IN RCRD: CPT | Mod: CPTII,S$GLB,, | Performed by: EMERGENCY MEDICINE

## 2022-08-11 PROCEDURE — 81003 POCT URINALYSIS, DIPSTICK, AUTOMATED, W/O SCOPE: ICD-10-PCS | Mod: QW,S$GLB,, | Performed by: EMERGENCY MEDICINE

## 2022-08-11 PROCEDURE — 3079F PR MOST RECENT DIASTOLIC BLOOD PRESSURE 80-89 MM HG: ICD-10-PCS | Mod: CPTII,S$GLB,, | Performed by: EMERGENCY MEDICINE

## 2022-08-11 PROCEDURE — 99214 OFFICE O/P EST MOD 30 MIN: CPT | Mod: S$GLB,,, | Performed by: EMERGENCY MEDICINE

## 2022-08-11 PROCEDURE — 3008F BODY MASS INDEX DOCD: CPT | Mod: CPTII,S$GLB,, | Performed by: EMERGENCY MEDICINE

## 2022-08-11 PROCEDURE — 3008F PR BODY MASS INDEX (BMI) DOCUMENTED: ICD-10-PCS | Mod: CPTII,S$GLB,, | Performed by: EMERGENCY MEDICINE

## 2022-08-11 RX ORDER — PROMETHAZINE HYDROCHLORIDE 12.5 MG/1
12.5 TABLET ORAL EVERY 4 HOURS PRN
Qty: 30 TABLET | Refills: 0 | Status: SHIPPED | OUTPATIENT
Start: 2022-08-11

## 2022-08-11 NOTE — LETTER
August 11, 2022      [facility] [address] [address]  Phone: [phone]  Fax: [phone]       Patient: Lelia Mendoza   YOB: 1965  Date of Visit: 08/11/2022    To Whom It May Concern:    Stephanie Mendoza  was at [facility] on 08/11/2022. The patient may return to work/school on 8/15/22 with no restrictions. If you have any questions or concerns, or if I can be of further assistance, please do not hesitate to contact me.    Sincerely,      Luis Barrow III, MD

## 2022-08-11 NOTE — PATIENT INSTRUCTIONS
Patient Education       Go to the Emergency Room if symptoms or condition worsens in any way      Tylenol 500mg 2 tabs by mouth every 8 hours  Max = 8 tabs per day          Viral Syndrome Discharge Instructions   About this topic   Viral syndrome is an illness with signs like you would get with a cold or the flu. A tiny germ called a virus causes this infection. Most people get better in 1 to 2 weeks without treatment. This illness spreads easily from person to person.     What care is needed at home?   Ask your doctor what you need to do when you go home. Make sure you ask questions if you do not understand what the doctor says. This way you will know what you need to do.  Drink lots of water, juice, or broth to replace fluids lost in runny nose and fever. Suck on ice chips or popsicles to ease throat pain.  Get lots of rest and sleep. Sleep helps your body get back the energy it needs.  Stay away from others until you are feeling better.  What follow-up care is needed?   Your doctor may ask you to make visits to the office to check on your progress. Be sure to keep these visits.  What drugs may be needed?   The doctor may order drugs to:  Help with pain  Lower fever  Help with pain from a sore throat  Help a runny or stuffy nose  Ease or stop coughing  Will physical activity be limited?   You need to rest while you are getting better. This means you may need to limit your activity until you feel well.  What changes to diet are needed?   Eat foods that will not upset your stomach like chicken soup, bananas, rice, apples, or toast.  What problems could happen?   Lung problems like pneumonia and bronchitis  Too much fluid loss  Infection  What can be done to prevent this health problem?   If you are sick, cover your mouth and nose with tissue when you cough or sneeze. You can also cough into your elbow. Throw away tissues in the trash and wash your hands after touching used tissues.  Wash your hands often with soap  and water for at least 20 seconds, especially after coughing or sneezing. Alcohol-based hand sanitizers also work to kill the virus.  Do not get too close (kissing, hugging) to people who are sick.  Stay away from crowded places.  Do not share towels or hankies with anyone who is sick. Clean commonly handled things like door handles, remotes, toys, and phones. Wipe them with a disinfectant.  Take vitamin C to help build up your body's ability to fight disease.  Get a flu shot each year.  When do I need to call the doctor?   Signs of infection. These include a fever of 100.4°F (38°C) or higher, chills, very bad sore throat, ear or sinus pain, cough, more sputum or change in color of sputum, and mouth sores.  Trouble breathing  Very bad throwing up or throwing up that does not stop  Health problem is not better or you are feeling worse  Teach Back: Helping You Understand   The Teach Back Method helps you understand the information we are giving you. After you talk with the staff, tell them in your own words what you learned. This helps to make sure the staff has described each thing clearly. It also helps to explain things that may have been confusing. Before going home, make sure you can do these:  I can tell you about my condition.  I can tell you what may help ease my sore throat.  I can tell you what I can do to help avoid passing the infection to others.  I can tell you what I will do if I have trouble breathing, very bad throwing up, or throwing up that does not stop.  Last Reviewed Date   2020-08-24  Consumer Information Use and Disclaimer   This information is not specific medical advice and does not replace information you receive from your health care provider. This is only a brief summary of general information. It does NOT include all information about conditions, illnesses, injuries, tests, procedures, treatments, therapies, discharge instructions or life-style choices that may apply to you. You must talk  with your health care provider for complete information about your health and treatment options. This information should not be used to decide whether or not to accept your health care providers advice, instructions or recommendations. Only your health care provider has the knowledge and training to provide advice that is right for you.  Copyright   Copyright © 2021 [employer] and its affiliates and/or licensors. All rights reserved.

## 2022-08-11 NOTE — PROGRESS NOTES
"Subjective:       Patient ID: Lelia Mendoza is a 57 y.o. female.    Vitals:  height is 5' (1.524 m) and weight is 104.3 kg (230 lb). Her temperature is 96.7 °F (35.9 °C). Her blood pressure is 151/88 (abnormal) and her pulse is 53 (abnormal). Her respiration is 20 and oxygen saturation is 98%.     Chief Complaint: Headache    Pt presents with abdominal pain, headaches, and a "burning" sensation of the hands and feet since yesterday.. Pt is treating symptoms with acetaminophen without relief. No other symptoms.     Headache   This is a new problem. The current episode started yesterday. The problem occurs constantly. The problem has been unchanged. The pain is located in the frontal region. The pain radiates to the left neck. The pain quality is not similar to prior headaches. The quality of the pain is described as dull and aching. The pain is at a severity of 9/10. The pain is severe. Associated symptoms include abdominal pain, back pain and nausea. Pertinent negatives include no coughing, fever or vomiting. She has tried acetaminophen for the symptoms. The treatment provided no relief.       Constitution: Positive for chills, fatigue and generalized weakness. Negative for fever.   HENT: Negative for congestion.    Respiratory: Negative for cough and sputum production.    Gastrointestinal: Positive for abdominal pain and nausea. Negative for vomiting, constipation, diarrhea and bright red blood in stool.   Genitourinary: Negative for dysuria, frequency and urgency.   Musculoskeletal: Positive for back pain and muscle ache. Negative for muscle cramps.   Skin: Negative for rash and erythema.   Neurological: Positive for headaches. Negative for passing out and facial drooping.       Objective:      Physical Exam   Constitutional: She is oriented to person, place, and time. She appears well-developed.      Comments:Increased BMI     HENT:   Head: Normocephalic and atraumatic. Head is without abrasion, without " contusion and without laceration.   Ears:   Right Ear: External ear normal.   Left Ear: External ear normal.   Oropharyngeal exam not performed due to risk of viral transmission during global pandemic-- risks outweigh benefits of exam          Comments: Oropharyngeal exam not performed due to risk of viral transmission during global pandemic-- risks outweigh benefits of exam      Eyes: Conjunctivae, EOM and lids are normal. Pupils are equal, round, and reactive to light. No scleral icterus.   Neck: Trachea normal and phonation normal. Neck supple.   Cardiovascular: Normal rate, regular rhythm and normal heart sounds.   Pulmonary/Chest: Effort normal and breath sounds normal. No stridor. No respiratory distress.   Musculoskeletal: Normal range of motion.         General: Normal range of motion.   Neurological: She is alert and oriented to person, place, and time.   Skin: Skin is warm, dry, intact and no rash. Capillary refill takes less than 2 seconds. No abrasion, No burn, No bruising, No erythema and No ecchymosis   Psychiatric: Her speech is normal and behavior is normal. Judgment and thought content normal.   Nursing note and vitals reviewed.        Assessment:       1. Nausea    2. Malaise and fatigue          Plan:         Nausea    Malaise and fatigue  -     POCT Urinalysis, Dipstick, Automated, W/O Scope  -     POCT Glucose, Hand-Held Device  -     POCT COVID-19 Rapid Screening    Other orders  -     promethazine (PHENERGAN) 12.5 MG Tab; Take 1 tablet (12.5 mg total) by mouth every 4 (four) hours as needed.  Dispense: 30 tablet; Refill: 0                 Patient Instructions   Patient Education       Go to the Emergency Room if symptoms or condition worsens in any way      Tylenol 500mg 2 tabs by mouth every 8 hours  Max = 8 tabs per day          Viral Syndrome Discharge Instructions   About this topic   Viral syndrome is an illness with signs like you would get with a cold or the flu. A tiny germ called a  virus causes this infection. Most people get better in 1 to 2 weeks without treatment. This illness spreads easily from person to person.     What care is needed at home?   · Ask your doctor what you need to do when you go home. Make sure you ask questions if you do not understand what the doctor says. This way you will know what you need to do.  · Drink lots of water, juice, or broth to replace fluids lost in runny nose and fever. Suck on ice chips or popsicles to ease throat pain.  · Get lots of rest and sleep. Sleep helps your body get back the energy it needs.  · Stay away from others until you are feeling better.  What follow-up care is needed?   Your doctor may ask you to make visits to the office to check on your progress. Be sure to keep these visits.  What drugs may be needed?   The doctor may order drugs to:  · Help with pain  · Lower fever  · Help with pain from a sore throat  · Help a runny or stuffy nose  · Ease or stop coughing  Will physical activity be limited?   You need to rest while you are getting better. This means you may need to limit your activity until you feel well.  What changes to diet are needed?   Eat foods that will not upset your stomach like chicken soup, bananas, rice, apples, or toast.  What problems could happen?   · Lung problems like pneumonia and bronchitis  · Too much fluid loss  · Infection  What can be done to prevent this health problem?   · If you are sick, cover your mouth and nose with tissue when you cough or sneeze. You can also cough into your elbow. Throw away tissues in the trash and wash your hands after touching used tissues.  · Wash your hands often with soap and water for at least 20 seconds, especially after coughing or sneezing. Alcohol-based hand sanitizers also work to kill the virus.  · Do not get too close (kissing, hugging) to people who are sick.  · Stay away from crowded places.  · Do not share towels or hankies with anyone who is sick. Clean commonly  handled things like door handles, remotes, toys, and phones. Wipe them with a disinfectant.  · Take vitamin C to help build up your body's ability to fight disease.  · Get a flu shot each year.  When do I need to call the doctor?   · Signs of infection. These include a fever of 100.4°F (38°C) or higher, chills, very bad sore throat, ear or sinus pain, cough, more sputum or change in color of sputum, and mouth sores.  · Trouble breathing  · Very bad throwing up or throwing up that does not stop  · Health problem is not better or you are feeling worse  Teach Back: Helping You Understand   The Teach Back Method helps you understand the information we are giving you. After you talk with the staff, tell them in your own words what you learned. This helps to make sure the staff has described each thing clearly. It also helps to explain things that may have been confusing. Before going home, make sure you can do these:  · I can tell you about my condition.  · I can tell you what may help ease my sore throat.  · I can tell you what I can do to help avoid passing the infection to others.  · I can tell you what I will do if I have trouble breathing, very bad throwing up, or throwing up that does not stop.  Last Reviewed Date   2020-08-24  Consumer Information Use and Disclaimer   This information is not specific medical advice and does not replace information you receive from your health care provider. This is only a brief summary of general information. It does NOT include all information about conditions, illnesses, injuries, tests, procedures, treatments, therapies, discharge instructions or life-style choices that may apply to you. You must talk with your health care provider for complete information about your health and treatment options. This information should not be used to decide whether or not to accept your health care providers advice, instructions or recommendations. Only your health care provider has the  knowledge and training to provide advice that is right for you.  Copyright   Copyright © 2021 [employer] and its affiliates and/or licensors. All rights reserved.

## 2022-09-29 DIAGNOSIS — Z12.31 OTHER SCREENING MAMMOGRAM: ICD-10-CM

## 2022-10-03 ENCOUNTER — PATIENT MESSAGE (OUTPATIENT)
Dept: ADMINISTRATIVE | Facility: HOSPITAL | Age: 57
End: 2022-10-03
Payer: COMMERCIAL

## 2024-10-01 ENCOUNTER — PATIENT MESSAGE (OUTPATIENT)
Dept: INTERNAL MEDICINE | Facility: CLINIC | Age: 59
End: 2024-10-01
Payer: COMMERCIAL